# Patient Record
Sex: FEMALE | Race: WHITE | Employment: FULL TIME | URBAN - METROPOLITAN AREA
[De-identification: names, ages, dates, MRNs, and addresses within clinical notes are randomized per-mention and may not be internally consistent; named-entity substitution may affect disease eponyms.]

---

## 2021-08-17 ENCOUNTER — OFFICE VISIT (OUTPATIENT)
Dept: ENDOCRINOLOGY | Age: 56
End: 2021-08-17
Payer: COMMERCIAL

## 2021-08-17 VITALS
SYSTOLIC BLOOD PRESSURE: 126 MMHG | HEART RATE: 85 BPM | OXYGEN SATURATION: 96 % | TEMPERATURE: 98 F | BODY MASS INDEX: 38.82 KG/M2 | RESPIRATION RATE: 14 BRPM | HEIGHT: 65 IN | WEIGHT: 233 LBS | DIASTOLIC BLOOD PRESSURE: 80 MMHG

## 2021-08-17 DIAGNOSIS — E66.01 CLASS 2 SEVERE OBESITY WITH SERIOUS COMORBIDITY AND BODY MASS INDEX (BMI) OF 38.0 TO 38.9 IN ADULT, UNSPECIFIED OBESITY TYPE (HCC): ICD-10-CM

## 2021-08-17 DIAGNOSIS — E04.9 GOITER: ICD-10-CM

## 2021-08-17 DIAGNOSIS — E78.2 MIXED HYPERLIPIDEMIA: ICD-10-CM

## 2021-08-17 PROBLEM — E66.9 CLASS 2 OBESITY WITH BODY MASS INDEX (BMI) OF 38.0 TO 38.9 IN ADULT: Status: ACTIVE | Noted: 2021-08-17

## 2021-08-17 PROBLEM — E66.812 CLASS 2 OBESITY WITH BODY MASS INDEX (BMI) OF 38.0 TO 38.9 IN ADULT: Status: ACTIVE | Noted: 2021-08-17

## 2021-08-17 PROCEDURE — 99204 OFFICE O/P NEW MOD 45 MIN: CPT | Performed by: INTERNAL MEDICINE

## 2021-08-17 RX ORDER — GLIPIZIDE 5 MG/1
TABLET, FILM COATED, EXTENDED RELEASE ORAL
COMMUNITY
Start: 2021-07-27 | End: 2021-08-17 | Stop reason: SDUPTHER

## 2021-08-17 RX ORDER — GLIPIZIDE 5 MG/1
TABLET, FILM COATED, EXTENDED RELEASE ORAL
Qty: 120 TABLET | Refills: 3
Start: 2021-08-17 | End: 2021-10-28 | Stop reason: SDUPTHER

## 2021-08-17 RX ORDER — LITHIUM CARBONATE 300 MG/1
CAPSULE ORAL
COMMUNITY
Start: 2021-07-19

## 2021-08-17 RX ORDER — ATORVASTATIN CALCIUM 20 MG/1
20 TABLET, FILM COATED ORAL DAILY
COMMUNITY
Start: 2021-06-02

## 2021-08-17 RX ORDER — HYDROXYZINE 50 MG/1
TABLET, FILM COATED ORAL
COMMUNITY
Start: 2021-07-20 | End: 2022-02-22

## 2021-08-17 RX ORDER — LAMOTRIGINE 25 MG/1
TABLET ORAL
COMMUNITY
Start: 2021-07-20 | End: 2022-05-24

## 2021-08-17 RX ORDER — ALPRAZOLAM 0.25 MG/1
TABLET ORAL
COMMUNITY
Start: 2021-07-20

## 2021-08-17 NOTE — PROGRESS NOTES
Jose Bojorquez is a 50 y.o. female who is being evaluated for Type 2 diabetes mellitus. Current symptoms/problems include hyperglycemia and are worsening. Type 2 diabetes mellitus, uncontrolled, with neuropathy (HCC) [E11.40, E11.65]    Diagnosed with Type 2 diabetes mellitus in 2018. Comorbid conditions: hyperlipidemia, obesity and Neuropathy    Current diabetic medications include: glipizide ER 5 mg 2 tabs bid, Metformin 500 mg 2 tabs in AM  Was not always taking her medication, but now taking them    Intolerance to diabetes medications: Yes Metformin higher dose caused diarrhea  Ozempic, Januvia too expensive    Weight trend: stable  Prior visit with dietician: yes  Current diet: on average, 3 meals per day  Current exercise: no     Current monitoring regimen: home blood tests - 2 times daily  Has brought blood glucose log/meter:  Yes  Home blood sugar records: fasting range: 130-220 and postprandial range: 160-200  Any episodes of hypoglycemia? No  Hypoglycemia frequency and time(s):    Does patient have Glucagon emergency kit? No  Does patient have rapid acting carbohydrate? No  Does patient wear a medic alert bracelet or necklace? No    2. Mixed hyperlipidemia  Has muscle pain    3. Class 2 severe obesity with serious comorbidity and body mass index (BMI) of 38.0 to 38.9 in adult, unspecified obesity type (HCC)  Trying to eat healthier  No exercise.     4.  Goiter  No difficulty swallowing, voice change  No history of radiation to her neck  No family history of thyroid cancer    Past Medical History:   Diagnosis Date    Anxiety     Diabetes mellitus ([address]     Hyperlipidemia       Patient Active Problem List   Diagnosis    Type 2 diabetes, uncontrolled, with [facility]    Mixed hyperlipidemia    Class 2 obesity with body mass index (BMI) of 38.0 to 38.9 in adult     Past Surgical History:   Procedure Laterality Date    HYSTERECTOMY      LITHOTRIPSY      x8     Social History Socioeconomic History    Marital status:      Spouse name: Not on file    Number of children: Not on file    Years of education: Not on file    Highest education level: Not on file   Occupational History    Not on file   Tobacco Use    Smoking status: Never Smoker    Smokeless tobacco: Never Used   Vaping Use    Vaping Use: Never used   Substance and Sexual Activity    Alcohol use: Not Currently    Drug use: Yes     Types: Marijuana     Comment: occasional    Sexual activity: Yes     Partners: Male   Other Topics Concern    Not on file   Social History Narrative    Not on file     Social Determinants of Health     Financial Resource Strain:     Difficulty of Paying Living Expenses:    Food Insecurity:     Worried About Running Out of Food in the Last Year:     [address] in the Last Year:    Transportation Needs:     Lack of Transportation (Medical):      Lack of Transportation (Non-Medical):    Physical Activity:     Days of Exercise per Week:     Minutes of Exercise per Session:    Stress:     Feeling of Stress :    Social Connections:     Frequency of Communication with Friends and Family:     Frequency of Social Gatherings with Friends and Family:     Attends Rest[employer] Services:     Active Member of Clubs or Organizations:     Attends Club or Organization Meetings:     Marital Status:    Intimate Partner Violence:     Fear of Current or Ex-Partner:     Emotionally Abused:     Physically Abused:     Sexually Abused:      Family History   Problem Relation Age of Onset    Diabetes type 2  Mother     Cancer Mother         pancreas    Cancer Father         esophagus    Diabetes type 2  Maternal Grandmother     Diabetes type 2  Maternal Grandfather     Diabetes Type 1  Daughter      Current Outpatient Medications   Medication Sig Dispense Refill    ALPRAZolam (XANAX) 0.25 MG tablet TAKE 1 TO 2 TABLETS BY MOUTH EVERY DAY AS NEEDED FOR EXTREME ANXIETY      orthopnea, no intermittent leg claudication, no palpitations  Gastrointestinal: no abdominal pain, no nausea, no vomiting, no diarrhea, no constipation, no dysphagia, no heartburn, no bloating  Genitourinary: no dysuria, no urinary incontinence, no urinary hesitancy, no urinary frequency, no feelings of urinary urgency, has nocturia  Musculoskeletal: no joint swelling, no joint stiffness, no joint pain, no muscle cramps, no muscle pain  Integument/Breast: no hair loss, no skin rashes, no skin lesions, no itching, has dry skin  Neurological: no numbness, no tingling, no weakness, no confusion, has headaches, no dizziness, no fainting, no tremors, no decrease in memory, no balance problems  Psychiatric: has anxiety, has depression, has insomnia  Hematologic/Lymphatic: no tendency for easy bleeding, no swollen lymph nodes, no tendency for easy bruising  Immunology: no seasonal allergies, no frequent infections, no frequent illnesses  Endocrine: no temperature intolerance    /80   Pulse 85   Temp 98 °F (36.7 °C)   Resp 14   Ht 5' 5\" (1.651 m)   Wt 233 lb (105.7 kg)   SpO2 96%   BMI 38.77 kg/m²    Wt Readings from Last 3 Encounters:   08/17/21 233 lb (105.7 kg)     Body mass index is 38.77 kg/m².       OBJECTIVE:  Constitutional: no acute distress, well appearing and well nourished  Psychiatric: oriented to person, place and time, judgement and insight and normal, recent and remote memory and intact and mood and affect are normal  Skin: skin and subcutaneous tissue is normal without mass, normal turgor  Head and Face: examination of head and face revealed no abnormalities  Eyes: no lid or conjunctival swelling, erythema or discharge, pupils are normal, equal, round, reactive to light  Ears/Nose: external inspection of ears and nose revealed no abnormalities, hearing is grossly normal  Oropharynx/Mouth/Face: lips, tongue and gums are normal with no lesions, the voice quality was normal  Neck: neck is supple hyperlipidemia  - atorvastatin (LIPITOR) 20 MG tablet  - Comprehensive Metabolic Panel; Future  - Lipid Panel; Future    3. Class 2 severe obesity with serious comorbidity and body mass index (BMI) of 38.0 to 38.9 in adult, unspecified obesity type (HCC)  Diet, exercise    4. Goiter  - T3, Free; Future  - T4, Free; Future  - TSH without Reflex; Future  - Anti-Thyroglobulin Antibody; Future  - Thyroid Peroxidase Antibody; Future  - US HEAD NECK SOFT TISSUE THYROID; Future      Reviewed and/or ordered clinical lab results Yes  Reviewed and/or ordered radiology tests Yes  Reviewed and/or ordered other diagnostic tests No  Discussed test results with performing physician No  Independently reviewed image, tracing, or specimen No  Made a decision to obtain old records No  Reviewed and summarized old records Yes   HbA1C 10.6    LDL 93  0.81  Obtained history from other than patient No    Kate Lackey was counseled regarding symptoms of diabetes, thyroid disease diagnosis, course and complications of disease if inadequately treated, side effects of medications, diagnosis, treatment options, and prognosis, risks, benefits, complications, and alternatives of treatment, labs, imaging and other studies and treatment targets and goals, medications available for treatment, side effects, benefits, complication prevention, basal and prandial glucose production and insulin requirements,. She understands instructions and counseling. These diagnosis were discussed and reviewed with the patient including the advantages of drug therapy. She was counseled at this visit on the following: diabetes complication prevention and foot care. Total time I spent for this encounter 45 minutes    Return in about 1 month (around 9/17/2021) for diabetes.     Electronically signed by Farideh Lock MD on 8/28/2021 at 11:54 PM

## 2021-09-07 ENCOUNTER — HOSPITAL ENCOUNTER (OUTPATIENT)
Dept: ULTRASOUND IMAGING | Age: 56
Discharge: HOME | End: 2021-09-07
Payer: COMMERCIAL

## 2021-09-07 DIAGNOSIS — E04.9 GOITER: ICD-10-CM

## 2021-09-07 PROCEDURE — 76536 US EXAM OF HEAD AND NECK: CPT

## 2021-10-28 RX ORDER — GLIPIZIDE 5 MG/1
TABLET, FILM COATED, EXTENDED RELEASE ORAL
Qty: 120 TABLET | Refills: 0 | Status: SHIPPED | OUTPATIENT
Start: 2021-10-28 | End: 2022-02-22

## 2021-10-28 NOTE — TELEPHONE ENCOUNTER
Requested Prescriptions     Pending Prescriptions Disp Refills    glipiZIDE (GLUCOTROL XL) 5 MG extended release tablet 120 tablet 0     Si tabs bid

## 2021-11-22 ENCOUNTER — PATIENT MESSAGE (OUTPATIENT)
Dept: ENDOCRINOLOGY | Age: 56
End: 2021-11-22

## 2021-11-22 NOTE — TELEPHONE ENCOUNTER
From: Toribio Cano  To: Dr. Gerry Gunter: 11/22/2021 2:02 PM EST  Subject: Prescription Question    I only have enough metformin to get me until Friday so I need a refill as soon as possible. On another note, I believe I've mentioned this before. I need all my prescriptions to go through our home delivery service, [facility]. I'm hoping she can call in a prescription as soon as possible, last month I was out for almost 2 weeks before getting it. Thanks for your time and help!     Toribio Cano

## 2022-02-14 DIAGNOSIS — E04.9 GOITER: ICD-10-CM

## 2022-02-14 DIAGNOSIS — E78.2 MIXED HYPERLIPIDEMIA: ICD-10-CM

## 2022-02-14 LAB
A/G RATIO: 1.5 (ref 1.1–2.2)
ALBUMIN SERPL-MCNC: 4.6 G/DL (ref 3.4–5)
ALP BLD-CCNC: 80 U/L (ref 40–129)
ALT SERPL-CCNC: 31 U/L (ref 10–40)
ANION GAP SERPL CALCULATED.3IONS-SCNC: 15 MMOL/L (ref 3–16)
ANTI-THYROGLOB ABS: 152 IU/ML
AST SERPL-CCNC: 20 U/L (ref 15–37)
BILIRUB SERPL-MCNC: 0.4 MG/DL (ref 0–1)
BUN BLDV-MCNC: 11 MG/DL (ref 7–20)
CALCIUM SERPL-MCNC: 9.6 MG/DL (ref 8.3–10.6)
CHLORIDE BLD-SCNC: 100 MMOL/L (ref 99–110)
CHOLESTEROL, TOTAL: 166 MG/DL (ref 0–199)
CO2: 20 MMOL/L (ref 21–32)
CREAT SERPL-MCNC: 0.8 MG/DL (ref 0.6–1.1)
CREATININE URINE: 166 MG/DL (ref 28–259)
GFR AFRICAN AMERICAN: >60
GFR NON-AFRICAN AMERICAN: >60
GLUCOSE BLD-MCNC: 222 MG/DL (ref 70–99)
HDLC SERPL-MCNC: 43 MG/DL (ref 40–60)
LDL CHOLESTEROL CALCULATED: 84 MG/DL
MICROALBUMIN UR-MCNC: 2.2 MG/DL
MICROALBUMIN/CREAT UR-RTO: 13.3 MG/G (ref 0–30)
POTASSIUM SERPL-SCNC: 4.7 MMOL/L (ref 3.5–5.1)
SODIUM BLD-SCNC: 135 MMOL/L (ref 136–145)
T3 FREE: 3.3 PG/ML (ref 2.3–4.2)
T4 FREE: 1.2 NG/DL (ref 0.9–1.8)
THYROID PEROXIDASE (TPO) ABS: 16 IU/ML
TOTAL PROTEIN: 7.6 G/DL (ref 6.4–8.2)
TRIGL SERPL-MCNC: 196 MG/DL (ref 0–150)
TSH SERPL DL<=0.05 MIU/L-ACNC: 3.94 UIU/ML (ref 0.27–4.2)
VLDLC SERPL CALC-MCNC: 39 MG/DL

## 2022-02-15 LAB
ESTIMATED AVERAGE GLUCOSE: 203 MG/DL
HBA1C MFR BLD: 8.7 %

## 2022-02-16 LAB — C-PEPTIDE: 8.1 NG/ML (ref 1.1–4.4)

## 2022-02-22 ENCOUNTER — TELEMEDICINE (OUTPATIENT)
Dept: ENDOCRINOLOGY | Age: 57
End: 2022-02-22
Payer: COMMERCIAL

## 2022-02-22 DIAGNOSIS — E78.2 MIXED HYPERLIPIDEMIA: ICD-10-CM

## 2022-02-22 DIAGNOSIS — E66.01 CLASS 2 SEVERE OBESITY WITH SERIOUS COMORBIDITY AND BODY MASS INDEX (BMI) OF 38.0 TO 38.9 IN ADULT, UNSPECIFIED OBESITY TYPE (HCC): ICD-10-CM

## 2022-02-22 PROCEDURE — 3052F HG A1C>EQUAL 8.0%<EQUAL 9.0%: CPT | Performed by: INTERNAL MEDICINE

## 2022-02-22 PROCEDURE — 99215 OFFICE O/P EST HI 40 MIN: CPT | Performed by: INTERNAL MEDICINE

## 2022-02-22 RX ORDER — DULAGLUTIDE 0.75 MG/.5ML
0.75 INJECTION, SOLUTION SUBCUTANEOUS WEEKLY
Qty: 4 PEN | Refills: 3 | Status: SHIPPED | OUTPATIENT
Start: 2022-02-22 | End: 2022-05-24 | Stop reason: SDUPTHER

## 2022-02-22 RX ORDER — GLIPIZIDE 5 MG/1
TABLET, FILM COATED, EXTENDED RELEASE ORAL
Qty: 360 TABLET | Refills: 1 | Status: SHIPPED | OUTPATIENT
Start: 2022-02-22 | End: 2022-05-24 | Stop reason: SDUPTHER

## 2022-02-22 RX ORDER — METFORMIN HYDROCHLORIDE 500 MG/1
500 TABLET, EXTENDED RELEASE ORAL
Qty: 180 TABLET | Refills: 1 | Status: SHIPPED | OUTPATIENT
Start: 2022-02-22 | End: 2022-05-24 | Stop reason: SDUPTHER

## 2022-02-22 RX ORDER — FLASH GLUCOSE SENSOR
KIT MISCELLANEOUS
Qty: 2 EACH | Refills: 3 | Status: SHIPPED | OUTPATIENT
Start: 2022-02-22 | End: 2022-07-05

## 2022-02-22 NOTE — PROGRESS NOTES
Mora Meier is a 49 y.o. female who is being evaluated for Type 2 diabetes mellitus. 2022    TELEHEALTH EVALUATION -- Audio/Visual (During [facility] public health emergency)    Patient provided verbal consent to use the video visit. HPI:    Mora Meier (:  1965) has requested an audio/video evaluation for the following concern(s):    Current symptoms/problems include hyperglycemia and are worsening. Type 2 diabetes, uncontrolled, with neuropathy (HCC) [E11.40, E11.65]    Diagnosed with Type 2 diabetes mellitus in 2018. Comorbid conditions: hyperlipidemia, obesity and Neuropathy    Current diabetic medications include: glipizide ER 5 mg 2 tabs bid, Metformin 500 mg 2 tabs in AM    Intolerance to diabetes medications: Yes Metformin higher dose caused diarrhea  Ozempic Januvia too expensive    Weight trend: stable  Prior visit with dietician: yes  Current diet: on average, 3 meals per day  Current exercise: no     Current monitoring regimen: home blood tests - 2 times daily  Has brought blood glucose log/meter:  Yes  Home blood sugar records: fasting range: 130-190 and postprandial range: 150-190  Any episodes of hypoglycemia? No  Hypoglycemia frequency and time(s):    Does patient have Glucagon emergency kit? No  Does patient have rapid acting carbohydrate? No  Does patient wear a medic alert bracelet or necklace? No    2. Mixed hyperlipidemia  Has muscle pain    3. Class 2 severe obesity with serious comorbidity and body mass index (BMI) of 38.0 to 38.9 in adult, unspecified obesity type (HCC)  Trying to eat healthier  No exercise. No difficulty swallowing, voice change  No history of radiation to her neck  No family history of thyroid cancer    4.  Hashimoto's thyroiditis  Has fatigue, dry skin, thinner hair    EXAM: US THYROID        INDICATION:  Goiter       COMPARISON:  None       TECHNIQUE:  Multiplanar grayscale analysis of the thyroid was performed.           FINDINGS:     Isthmus: 3 mm in anteroposterior dimension.       Right Lobe: 4.5 x 2.3 x 1.4 cm.       Left Lobe: 4.1 x 2.0 x 1.6 cm.       Other: The thyroid gland is diffusely heterogeneous in echogenicity, but no discrete cyst or nodule identified.               Impression       Heterogeneous thyroid gland, but no suspicious nodules.           Past Medical History:   Diagnosis Date    Anxiety     Diabetes mellitus [facility]     Hyperlipidemia       Patient Active Problem List   Diagnosis    Type 2 diabetes, uncontrolled, with neuropathy ([facility] [facility]    Mixed hyperlipidemia    Class 2 obesity with body mass index (BMI) of 38.0 to 38.9 in adult     Past Surgical History:   Procedure Laterality Date    HYSTERECTOMY      LITHOTRIPSY      x8     Social History     Socioeconomic History    Marital status:      Spouse name: Not on file    Number of children: Not on file    Years of education: Not on file    Highest education level: Not on file   Occupational History    Not on file   Tobacco Use    Smoking status: Never Smoker    Smokeless tobacco: Never Used   Vaping Use    Vaping Use: Never used   Substance and Sexual Activity    Alcohol use: Not Currently    Drug use: Yes     Types: Marijuana Clyo Madeline)     Comment: occasional    Sexual activity: Yes     Partners: Male   Other Topics Concern    Not on file   Social History Narrative    Not on file     Social Determinants of Health     Financial Resource Strain:     Difficulty of Paying Living Expenses: Not on file   Food Insecurity:     Worried About Running Out of Food in the Last Year: Not on file    [place]ael of Food in the Last Year: Not on file   Transportation Needs:     Lack of Transportation (Medical): Not on file    Lack of Transportation (Non-Medical):  Not on file   Physical Activity:     Days of Exercise per Week: Not on file    Minutes of Exercise per Session: Not on file   Stress:     Feeling of Stress : Not on file   Social Connections:     Frequency of Communication with Friends and Family: Not on file    Frequency of Social Gatherings with Friends and Family: Not on file    Attends Synagogue Services: Not on file    Active Member of Clubs or Organizations: Not on file    Attends Club or Organization Meetings: Not on file    Marital Status: Not on file   Intimate Partner Violence:     Fear of Current or Ex-Partner: Not on file    Emotionally Abused: Not on file    Physically Abused: Not on file    Sexually Abused: Not on file   Housing Stability:     Unable to Pay for Housing in the Last Year: Not on file    Number of [place] in the Last Year: Not on file    Unstable Housing in the Last Year: Not on file     Family History   Problem Relation Age of Onset    Diabetes type 2  Mother     Cancer Mother         pancreas    Cancer Father         esophagus    Diabetes type 2  Maternal Grandmother     Diabetes type 2  Maternal Grandfather     Diabetes Type 1  Daughter      Current Outpatient Medications   Medication Sig Dispense Refill    OMEPRAZOLE PO Take by mouth      metFORMIN (GLUCOPHAGE-XR) 500 MG extended release tablet Take 1 tablet by mouth daily (with breakfast) 180 tablet 1    Dulaglutide (TRULICITY) 8.28 PF/8.2TA SOPN Inject 0.75 mg into the skin once a week 4 pen 3    glipiZIDE (GLUCOTROL XL) 5 MG extended release tablet 2 tabs twice a day 360 tablet 1    Continuous Blood Gluc Sensor (FREESTYLE DAVID 2 SENSOR) MISC Change every 14 days 2 each 3    ALPRAZolam (XANAX) 0.25 MG tablet TAKE 1 TO 2 TABLETS BY MOUTH EVERY DAY AS NEEDED FOR EXTREME ANXIETY      atorvastatin (LIPITOR) 20 MG tablet Take 20 mg by mouth daily       lamoTRIgine (LAMICTAL) 25 MG tablet TAKE 1 TABLET BY MOUTH AT BEDTIME FOR 14 DAYS AS DIRECTED. INCREASE TO 2 TABLETS. STOP IF RASH OCCURS      lithium 300 MG capsule TAKE 1 CAPSULE BY MOUTH EVERY MORNING       No current facility-administered medications for this visit.      No Known Allergies  Family Status   Relation Name Status    Mother      Father      MGM  (Not Specified)    MGF  (Not Specified)    Karen  (Not Specified)       Lab Review:    No results found for: WBC, HGB, HCT, MCV, PLT  Lab Results   Component Value Date     2022    K 4.7 2022     2022    CO2 20 2022    BUN 11 2022    CREATININE 0.8 2022    GLUCOSE 222 2022    CALCIUM 9.6 2022    PROT 7.6 2022    LABALBU 4.6 2022    BILITOT 0.4 2022    ALKPHOS 80 2022    AST 20 2022    ALT 31 2022    LABGLOM >60 2022    GFRAA >60 2022    AGRATIO 1.5 2022     Lab Results   Component Value Date    TSH 3.94 2022    FT3 3.3 2022     Lab Results   Component Value Date    LABA1C 8.7 2022     Lab Results   Component Value Date    .0 2022     Lab Results   Component Value Date    CHOL 166 2022     Lab Results   Component Value Date    TRIG 196 2022     Lab Results   Component Value Date    HDL 43 2022     Lab Results   Component Value Date    LDLCALC 84 2022     Lab Results   Component Value Date    LABVLDL 39 2022     No results found for: CHOLHDLRATIO  Lab Results   Component Value Date    LABMICR 2.20 2022     No results found for: VITD25     Review of Systems:  Constitutional: no fatigue, no fever, no recent weight gain, no recent weight loss, no changes in appetite  Eyes: no eye pain, has change in vision, no eye redness, no eye irritation, no double vision  Ears, nose, throat: no nasal congestion, no sore throat, no earache, no decrease in hearing, no hoarseness, no dry mouth, no sinus problems, no difficulty swallowing, no neck lumps, no dental problems, no mouth sores, no ringing in ears  Pulmonary: no shortness of breath, no wheezing, no dyspnea on exertion, no cough  Cardiovascular: no chest pain, no lower extremity edema, no orthopnea, no intermittent leg claudication, no palpitations  Gastrointestinal: no abdominal pain, no nausea, no vomiting, no diarrhea, no constipation, no dysphagia, no heartburn, no bloating  Genitourinary: no dysuria, no urinary incontinence, no urinary hesitancy, no urinary frequency, no feelings of urinary urgency, has nocturia  Musculoskeletal: no joint swelling, no joint stiffness, no joint pain, no muscle cramps, no muscle pain  Integument/Breast: no hair loss, no skin rashes, no skin lesions, no itching, has dry skin  Neurological: no numbness, no tingling, no weakness, no confusion, has headaches, no dizziness, no fainting, no tremors, no decrease in memory, no balance problems  Psychiatric: has anxiety, has depression, has insomnia  Hematologic/Lymphatic: no tendency for easy bleeding, no swollen lymph nodes, no tendency for easy bruising  Immunology: no seasonal allergies, no frequent infections, no frequent illnesses  Endocrine: no temperature intolerance    There were no vitals taken for this visit. Wt Readings from Last 3 Encounters:   08/17/21 233 lb (105.7 kg)     There is no height or weight on file to calculate BMI.     OBJECTIVE:  Constitutional: no apparent distress, well developed and well nourished  Mental status: alert and awake, oriented to person, place and time, able to follow commands  Psychiatric: judgement and insight and normal, recent and remote memory are intact, mood and affect are normal  Skin: skin inspection appears normal, no significant exanthematous lesions or discoloration noted on facial skin  Head and Face: head and face inspection revealed no abnormalities, normocephalic, atraumatic  Eyes: no lid or conjunctival swelling, erythema or discharge, sclera appears normal  Ears/Nose: external inspection of ears and nose revealed no abnormalities, hearing is grossly normal  Oropharynx/Mouth/Face: lips are normal with no lesions, the voice quality was normal  Neck: neck is symmetric, no visualized mass  Pulmonary/chest: respiratory effort normal, no generalized signs of difficulty breathing or signs of respiratory distress  Musculoskeletal: normal station, normal range of motion of neck  Neurological: no facial asymmetry, normal general cortical function    ASSESSMENT/PLAN:  1. Type 2 diabetes mellitus, uncontrolled, with neuropathy ([facility] [address]  Recommend Richar  Uncontrolled diabetes, fluctuating BG  Changed insurance, will start Trulicity 5.66 mg, increase as tolerated in 1 month  Hemoglobin A1c 10.68.7  C-peptide 8.1, glucose 222  Check BG 3-4 times daily  Dietitian consultation  And discussed with patient that she will need more medications or insulin to control her diabetes. Patient stated that few of her medications were too expensive. I discussed insulin regimen. She would like to try diet and exercise. - metFORMIN (GLUCOPHAGE) 500 MG ER tablet; 2 tabs every morning   - glipiZIDE (GLUCOTROL XL) 5 MG extended release tablet; 2 tabs bid    - Hemoglobin A1C; Future  - Comprehensive Metabolic Panel; Future  - Lipid Panel; Future  - Microalbumin / Creatinine Urine Ratio; Future  - [facility] Individual Diabetes Education (Non Care Coord Patient), [facility]    2. Mixed hyperlipidemia  LDL 84  Triglycerides 196  HDL 43  TSH 3.94  - atorvastatin (LIPITOR) 20 MG tablet  - Comprehensive Metabolic Panel; Future  - Lipid Panel; Future    3. Class 2 severe obesity with serious comorbidity and body mass index (BMI) of 38.0 to 38.9 in adult, unspecified obesity type (HCC)  Diet, exercise    4. Hashimoto's thyroiditis  TSH 3.94  - T3, Free; Future  - T4, Free; Future  - TSH without Reflex;  Future      Reviewed and/or ordered clinical lab results Yes  Reviewed and/or ordered radiology tests Yes  Reviewed and/or ordered other diagnostic tests No  Discussed test results with performing physician No  Independently reviewed image, tracing, or specimen No  Made a decision to obtain old records No  Reviewed and summarized old records Yes   HbA1C 10.6    LDL 93  0.81  Obtained history from other than patient No    Ann Wolfe was counseled regarding symptoms of diabetes, thyroid disease diagnosis, course and complications of disease if inadequately treated, side effects of medications, diagnosis, treatment options, and prognosis, risks, benefits, complications, and alternatives of treatment, labs, imaging and other studies and treatment targets and goals, medications available for treatment, side effects, benefits, complication prevention, basal and prandial glucose production and insulin requirements, Trulicity, Richar 2. She understands instructions and counseling. These diagnosis were discussed and reviewed with the patient including the advantages of drug therapy. She was counseled at this visit on the following: diabetes complication prevention and foot care. Ann Wolfe is a 49 y.o. female was evaluated through a synchronous (real-time) audio-video encounter. The patient (or guardian if applicable) is aware that this is billable service, which includes applicable co-pays. This virtual visit was conducted with the patient's (and/or legal guardian's) consent. The visit was conducted pursuant to the emergency declaration under the [address] [place] [address], [address] [place] [address] authority and the [employer] General Act. Patient identification was verified, and a caregiver was present when appropriate. I conducted an interview, performed a limited exam by video and educated the patient on my assessment and plan. Due to this being a TeleHealth encounter (During [facility] public health emergency), evaluation of the following organ systems was limited: Vitals/Constitutional/EENT/Resp/CV/GI//MS/Neuro/Skin/Heme-Lymph-Imm.       The patient (and/or legal guardian) has also been advised to contact this office for worsening conditions or problems, and seek emergency medical treatment and/or call 911 if deemed necessary. Total time spent on this encounter via Telehealth (synchronous, real-time audio/visual connection): 40 min  See assessment, plan and counseling note for counseling and care coordination details. Persons participating in the telehealth service: provider - Patsy Galindo MD and patient Ann Wolfe. Provider was located at her office. Patient was located at home. The patient was located in the state where the provider was licensed to provide care. --Patsy Galindo MD on 2/22/2022 at 7:42 AM    An electronic signature was used to authenticate this note. Return in about 3 months (around 5/22/2022) for diabetes.     Electronically signed by Patsy Galindo MD on 2/22/2022 at 7:42 AM

## 2022-03-31 ENCOUNTER — PATIENT MESSAGE (OUTPATIENT)
Dept: ENDOCRINOLOGY | Age: 57
End: 2022-03-31

## 2022-03-31 NOTE — TELEPHONE ENCOUNTER
From: Mala Willingham  To: Dr. Davison Hum: 3/31/2022 10:12 AM EDT  Subject: Trulicity Questions    Have been taking Trulicity for 4 weeks, but just recently (within past few days), I have been having crazy low glucose readings, then high spike, then lows again (on 3/30 had 10 of them). Also today (3/31) I'm having a bit of abdomen pain (not severe but noticeable). In addition have a small rash around injection site. This is the first time since beginning Trulicity that I've had these issues, is this normal? I would have expected these symptoms to appear early on. Also I'm using a Continuous Glucose Monitor (Freestyle Richar 2) as well as doing my finger sticks, some times they are several \"points\" different (like 38-40) but other times they are within 5-10 \"points\" different. Which should I trust as being the more accurate? Thank you for your time and help.     Danita Latham

## 2022-04-01 ENCOUNTER — TELEPHONE (OUTPATIENT)
Dept: ENDOCRINOLOGY | Age: 57
End: 2022-04-01

## 2022-04-01 NOTE — TELEPHONE ENCOUNTER
I spoke with patient. I informed her that if she has a worsening of abdominal pain, nausea, vomiting, dehydration, go to the ER because potential adverse effect this pancreatitis. Patient stated that abdominal pain is not significant at this time. Discussed CGM and fingerstick differences in blood glucose levels, causes for it, answered other questions. Advised to follow carefully for site reaction and if reaction persists or gets worse, she might not be able to continue Trulicity. Also advised patient that if blood glucose is low, she will need to decrease glipizide.

## 2022-05-19 DIAGNOSIS — E78.2 MIXED HYPERLIPIDEMIA: ICD-10-CM

## 2022-05-19 LAB
A/G RATIO: 1.6 (ref 1.1–2.2)
ALBUMIN SERPL-MCNC: 4.4 G/DL (ref 3.4–5)
ALP BLD-CCNC: 94 U/L (ref 40–129)
ALT SERPL-CCNC: 26 U/L (ref 10–40)
ANION GAP SERPL CALCULATED.3IONS-SCNC: 15 MMOL/L (ref 3–16)
AST SERPL-CCNC: 17 U/L (ref 15–37)
BILIRUB SERPL-MCNC: 0.4 MG/DL (ref 0–1)
BUN BLDV-MCNC: 15 MG/DL (ref 7–20)
CALCIUM SERPL-MCNC: 9.6 MG/DL (ref 8.3–10.6)
CHLORIDE BLD-SCNC: 103 MMOL/L (ref 99–110)
CHOLESTEROL, TOTAL: 132 MG/DL (ref 0–199)
CO2: 21 MMOL/L (ref 21–32)
CREAT SERPL-MCNC: 0.9 MG/DL (ref 0.6–1.1)
GFR AFRICAN AMERICAN: >60
GFR NON-AFRICAN AMERICAN: >60
GLUCOSE BLD-MCNC: 120 MG/DL (ref 70–99)
HDLC SERPL-MCNC: 41 MG/DL (ref 40–60)
LDL CHOLESTEROL CALCULATED: 69 MG/DL
POTASSIUM SERPL-SCNC: 4.3 MMOL/L (ref 3.5–5.1)
SODIUM BLD-SCNC: 139 MMOL/L (ref 136–145)
T3 FREE: 2.9 PG/ML (ref 2.3–4.2)
T4 FREE: 1.2 NG/DL (ref 0.9–1.8)
TOTAL PROTEIN: 7.2 G/DL (ref 6.4–8.2)
TRIGL SERPL-MCNC: 108 MG/DL (ref 0–150)
TSH SERPL DL<=0.05 MIU/L-ACNC: 3 UIU/ML (ref 0.27–4.2)
VLDLC SERPL CALC-MCNC: 22 MG/DL

## 2022-05-20 LAB
ESTIMATED AVERAGE GLUCOSE: 145.6 MG/DL
HBA1C MFR BLD: 6.7 %

## 2022-05-24 ENCOUNTER — OFFICE VISIT (OUTPATIENT)
Dept: ENDOCRINOLOGY | Age: 57
End: 2022-05-24
Payer: COMMERCIAL

## 2022-05-24 VITALS
DIASTOLIC BLOOD PRESSURE: 95 MMHG | HEIGHT: 65 IN | BODY MASS INDEX: 36.29 KG/M2 | OXYGEN SATURATION: 97 % | WEIGHT: 217.8 LBS | SYSTOLIC BLOOD PRESSURE: 150 MMHG | HEART RATE: 77 BPM

## 2022-05-24 DIAGNOSIS — E06.3 HASHIMOTO'S THYROIDITIS: ICD-10-CM

## 2022-05-24 DIAGNOSIS — E66.01 CLASS 2 SEVERE OBESITY WITH SERIOUS COMORBIDITY AND BODY MASS INDEX (BMI) OF 38.0 TO 38.9 IN ADULT, UNSPECIFIED OBESITY TYPE (HCC): ICD-10-CM

## 2022-05-24 DIAGNOSIS — E11.40 TYPE 2 DIABETES, CONTROLLED, WITH NEUROPATHY (HCC): Primary | ICD-10-CM

## 2022-05-24 DIAGNOSIS — E78.2 MIXED HYPERLIPIDEMIA: ICD-10-CM

## 2022-05-24 PROCEDURE — 3044F HG A1C LEVEL LT 7.0%: CPT | Performed by: INTERNAL MEDICINE

## 2022-05-24 PROCEDURE — 99214 OFFICE O/P EST MOD 30 MIN: CPT | Performed by: INTERNAL MEDICINE

## 2022-05-24 PROCEDURE — 95251 CONT GLUC MNTR ANALYSIS I&R: CPT | Performed by: INTERNAL MEDICINE

## 2022-05-24 RX ORDER — GLIPIZIDE 5 MG/1
TABLET, FILM COATED, EXTENDED RELEASE ORAL
Qty: 360 TABLET | Refills: 1 | Status: SHIPPED | OUTPATIENT
Start: 2022-05-24 | End: 2022-09-12

## 2022-05-24 RX ORDER — LAMOTRIGINE 100 MG/1
100 TABLET ORAL DAILY
COMMUNITY

## 2022-05-24 RX ORDER — METFORMIN HYDROCHLORIDE 500 MG/1
500 TABLET, EXTENDED RELEASE ORAL
Qty: 90 TABLET | Refills: 1 | Status: SHIPPED | OUTPATIENT
Start: 2022-05-24 | End: 2022-10-03 | Stop reason: SDUPTHER

## 2022-05-24 RX ORDER — DULAGLUTIDE 0.75 MG/.5ML
0.75 INJECTION, SOLUTION SUBCUTANEOUS WEEKLY
Qty: 12 PEN | Refills: 1 | Status: SHIPPED | OUTPATIENT
Start: 2022-05-24 | End: 2022-10-03 | Stop reason: SDUPTHER

## 2022-05-24 NOTE — PROGRESS NOTES
Dimitri Gilford is a 49 y.o. female who is being evaluated for Type 2 diabetes mellitus. Current symptoms/problems include hyperglycemia and are worsening. Type 2 diabetes, controlled, with neuropathy ([address] [E11.40]    Diagnosed with Type 2 diabetes mellitus in 2018. Comorbid conditions: hyperlipidemia, obesity and Neuropathy     Current diabetic medications include: glipizide ER 5 mg 2 tabs bid, Metformin  mg 1 tab in AM, Trulicity 4.505 mg weekly     Intolerance to diabetes medications: Yes Metformin higher dose caused diarrhea  Ozempadalberto Januvia too expensive     Weight trend: stable  Prior visit with dietician: yes  Current diet: on average, 3 meals per day  Current exercise: no     Current monitoring regimen: home blood tests - 2 times daily  Has brought blood glucose log/meter:  Yes  Home blood sugar records: fasting range: 100-120 and postprandial range: 100-140  Any episodes of hypoglycemia? No  Hypoglycemia frequency and time(s):    Does patient have Glucagon emergency kit? No  Does patient have rapid acting carbohydrate?  No  Does patient wear a medic alert bracelet or necklace?  No     2. Mixed hyperlipidemia  Has muscle pain     3. Obesity  Trying to eat healthier  No exercise. No difficulty swallowing, voice change  No history of radiation to her neck  No family history of thyroid cancer     4.  Hashimoto's thyroiditis  Has fatigue, dry skin, thinner hair    Past Medical History:   Diagnosis Date    Anxiety     Diabetes mellitus ([address]     Hyperlipidemia       Patient Active Problem List   Diagnosis    Type 2 diabetes, uncontrolled, with neuropathy ([address]    Mixed hyperlipidemia    Class 2 obesity with body mass index (BMI) of 38.0 to 38.9 in adult    Hashimoto's thyroiditis    Type 2 diabetes, controlled, with neuropathy ([address]     Past Surgical History:   Procedure Laterality Date    HYSTERECTOMY      LITHOTRIPSY      x8     Social History     Socioeconomic History    Marital status:      Spouse name: Not on file    Number of children: Not on file    Years of education: Not on file    Highest education level: Not on file   Occupational History    Not on file   Tobacco Use    Smoking status: Never Smoker    Smokeless tobacco: Never Used   Vaping Use    Vaping Use: Never used   Substance and Sexual Activity    Alcohol use: Not Currently    Drug use: Yes     Types: Marijuana Darliss Meeter)     Comment: occasional    Sexual activity: Yes     Partners: Male   Other Topics Concern    Not on file   Social History Narrative    Not on file     Social Determinants of Health     Financial Resource Strain:     Difficulty of Paying Living Expenses: Not on file   Food Insecurity:     Worried About Running Out of Food in the Last Year: Not on file    Gael of Food in the Last Year: Not on file   Transportation Needs:     Lack of Transportation (Medical): Not on file    Lack of Transportation (Non-Medical):  Not on file   Physical Activity:     Days of Exercise per Week: Not on file    Minutes of Exercise per Session: Not on file   Stress:     Feeling of Stress : Not on file   Social Connections:     Frequency of Communication with Friends and Family: Not on file    Frequency of Social Gatherings with Friends and Family: Not on file    Attends [employer] Services: Not on file    Active Member of [address] [place] [address] [employer] or Organizations: Not on file    Attends Club or Organization Meetings: Not on file    Marital Status: Not on file   Intimate Partner Violence:     Fear of Current or Ex-Partner: Not on file    Emotionally Abused: Not on file    Physically Abused: Not on file    Sexually Abused: Not on file   Housing Stability:     Unable to Pay for Housing in the Last Year: Not on file    Number of [place] in the Last Year: Not on file    Unstable Housing in the Last Year: Not on file     Family History   Problem Relation Age of Onset    Diabetes type 2  Mother     Cancer Mother pancreas    Cancer Father         esophagus    Diabetes type 2  Maternal Grandmother     Diabetes type 2  Maternal Grandfather     Diabetes Type 1  Daughter      Current Outpatient Medications   Medication Sig Dispense Refill    lamoTRIgine (LAMICTAL) 100 MG tablet Take 100 mg by mouth daily      metFORMIN (GLUCOPHAGE-XR) 500 MG extended release tablet Take 1 tablet by mouth daily (with breakfast) 90 tablet 1    glipiZIDE (GLUCOTROL XL) 5 MG extended release tablet 2 tabs twice a day 360 tablet 1    Dulaglutide (TRULICITY) 7.88 JH/1.8BM SOPN Inject 0.75 mg into the skin once a week 12 pen 1    OMEPRAZOLE PO Take by mouth      Continuous Blood Gluc Sensor (FREESTYLE DAVID 2 SENSOR) MISC Change every 14 days 2 each 3    ALPRAZolam (XANAX) 0.25 MG tablet TAKE 1 TO 2 TABLETS BY MOUTH EVERY DAY AS NEEDED FOR EXTREME ANXIETY      atorvastatin (LIPITOR) 20 MG tablet Take 20 mg by mouth daily       lithium 300 MG capsule TAKE 1 CAPSULE BY MOUTH EVERY MORNING       No current facility-administered medications for this visit.      No Known Allergies  Family Status   Relation Name Status    Mother      Father      MGM  (Not Specified)    MGF  (Not Specified)    Karen  (Not Specified)       Lab Review:    No results found for: WBC, HGB, HCT, MCV, PLT  Lab Results   Component Value Date     2022    K 4.3 2022     2022    CO2 21 2022    BUN 15 2022    CREATININE 0.9 2022    GLUCOSE 120 2022    CALCIUM 9.6 2022    PROT 7.2 2022    LABALBU 4.4 2022    BILITOT 0.4 2022    ALKPHOS 94 2022    AST 17 2022    ALT 26 2022    LABGLOM >60 2022    GFRAA >60 2022    AGRATIO 1.6 2022     Lab Results   Component Value Date    TSH 3.00 2022    FT3 2.9 2022     Lab Results   Component Value Date    LABA1C 6.7 2022     Lab Results   Component Value Date    .6 2022 Lab Results   Component Value Date    CHOL 132 05/19/2022     Lab Results   Component Value Date    TRIG 108 05/19/2022     Lab Results   Component Value Date    HDL 41 05/19/2022     Lab Results   Component Value Date    LDLCALC 69 05/19/2022     Lab Results   Component Value Date    LABVLDL 22 05/19/2022     No results found for: [facility]  Lab Results   Component Value Date    LABMICR 2.20 02/14/2022     No results found for: VITD25     Review of Systems:  Constitutional: no fatigue, no fever, no recent weight gain, no recent weight loss, no changes in appetite  Eyes: no eye pain, has change in vision, no eye redness, no eye irritation, no double vision  Ears, nose, throat: no nasal congestion, no sore throat, no earache, no decrease in hearing, no hoarseness, no dry mouth, no sinus problems, no difficulty swallowing, no neck lumps, no dental problems, no mouth sores, no ringing in ears  Pulmonary: no shortness of breath, no wheezing, no dyspnea on exertion, no cough  Cardiovascular: no chest pain, no lower extremity edema, no orthopnea, no intermittent leg claudication, no palpitations  Gastrointestinal: no abdominal pain, no nausea, no vomiting, no diarrhea, no constipation, no dysphagia, no heartburn, no bloating  Genitourinary: no dysuria, no urinary incontinence, no urinary hesitancy, no urinary frequency, no feelings of urinary urgency, has nocturia  Musculoskeletal: no joint swelling, no joint stiffness, no joint pain, no muscle cramps, no muscle pain  Integument/Breast: no hair loss, no skin rashes, no skin lesions, no itching, has dry skin  Neurological: no numbness, no tingling, no weakness, no confusion, has headaches, no dizziness, no fainting, no tremors, no decrease in memory, no balance problems  Psychiatric: has anxiety, has depression, has insomnia  Hematologic/Lymphatic: no tendency for easy bleeding, no swollen lymph nodes, no tendency for easy bruising  Immunology: no seasonal allergies, no frequent infections, no frequent illnesses  Endocrine: no temperature intolerance    BP (!) 150/95 (Site: Right Upper Arm, Position: Sitting, Cuff Size: Large Adult)   Pulse 77   Ht 5' 5\" (1.651 m)   Wt 217 lb 12.8 oz (98.8 kg)   SpO2 97%   BMI 36.24 kg/m²    Wt Readings from Last 3 Encounters:   05/24/22 217 lb 12.8 oz (98.8 kg)   08/17/21 233 lb (105.7 kg)     Body mass index is 36.24 kg/m².       OBJECTIVE:  Constitutional: no acute distress, well appearing and well nourished  Psychiatric: oriented to person, place and time, judgement and insight and normal, recent and remote memory and intact and mood and affect are normal  Skin: skin and subcutaneous tissue is normal without mass, normal turgor  Head and Face: examination of head and face revealed no abnormalities  Eyes: no lid or conjunctival swelling, erythema or discharge, pupils are normal, equal, round, reactive to light  Ears/Nose: external inspection of ears and nose revealed no abnormalities, hearing is grossly normal  Oropharynx/Mouth/Face: lips, tongue and gums are normal with no lesions, the voice quality was normal  Neck: neck is supple and symmetric, with midline trachea and no masses, thyroid is enlarged, more on the left  Lymphatics: normal cervical lymph nodes, normal supraclavicular nodes  Pulmonary: no increased work of breathing or signs of respiratory distress, lungs are clear to auscultation  Cardiovascular: normal heart rate and rhythm, normal S1 and S2, no murmurs and pedal pulses and 2+ bilaterally, No edema  Abdomen: abdomen is soft, non-tender with no masses  Musculoskeletal: normal gait and station and exam of the digits and nails are normal  Neurological: normal coordination and normal general cortical function    Foot exam 8/2021:  Visual inspection:  Deformity/amputation: absent  Skin lesions/pre-ulcerative calluses: present calluses  Edema: right- negative, left- negative    Sensory exam:  Monofilament sensation: normal  (minimum of 5 random plantar locations tested, avoiding callused areas - > 1 area with absence of sensation is + for neuropathy)    Plus at least one of the following:  Pulses: normal  Proprioception: Intact  Vibration (128 Hz): Impaired    ASSESSMENT/PLAN:      1. Type 2 diabetes mellitus, controlled, with neuropathy ([facility]  Will do foot exam next appointment. Continue Zura!, will start Trulicity 9.88 mg, increase as tolerated in 1 month  Hemoglobin A1c 10.6-8.7-6.7  C-peptide 8.1, glucose 222  Check BG 3-4 times daily  Dietitian consultation when patient agrees  I discussed insulin regimen. Continue diet and exercise. - metFORMIN (GLUCOPHAGE) 500 MG ER tablet; 1 tab every morning   - glipiZIDE (GLUCOTROL XL) 5 MG extended release tablet; 2 tabs bid    - Hemoglobin A1C; Future  - Comprehensive Metabolic Panel; Future  - Lipid Panel; Future  - Microalbumin / Creatinine Urine Ratio; Future  - [facility] Individual Diabetes Education (Non Care Coord Patient)[facility]     2. Mixed hyperlipidemia  LDL 84-69  Triglycerides 196-108  HDL 43-41  TSH 3.94-3.00  - atorvastatin (LIPITOR) 20 MG tablet  - Comprehensive Metabolic Panel; Future  - Lipid Panel; Future     3. Obesity  Diet, exercise     4. Hashimoto's thyroiditis  TSH 3.94  - T3, Free; Future  - T4, Free; Future  - TSH without Reflex;  Future       Reviewed and/or ordered clinical lab results Yes  Reviewed and/or ordered radiology tests Yes  Reviewed and/or ordered other diagnostic tests No  Discussed test results with performing physician No  Independently reviewed image, tracing, or specimen No  Made a decision to obtain old records No  Reviewed and summarized old records Yes   HbA1C 10.6    LDL 93  0.81  Obtained history from other than patient No    Andrew Bright was counseled regarding symptoms of diabetes, thyroid disease diagnosis, course and complications of disease if inadequately treated, side effects of medications, diagnosis, treatment options, and prognosis, risks, benefits, complications, and alternatives of treatment, labs, imaging and other studies and treatment targets and goals, medications available for treatment, side effects, benefits, complication prevention, basal and prandial glucose production and insulin requirements,. She understands instructions and counseling. CGMS Download Review and Recommendations  See scanned document for BG tracing documentation  Fi[employer]tt personal CGMS data downloaded and reviewed. This was separate service provided- CGM interpretation    Average glucose 124 ± 26. 1SD  Time in range: 92 %  Time above 180: 6 %  Time under 70: 2 %     Basal pattern review: Basal normoglycemia  Postprandial pattern review: postprandial herglycemia at times  Hypoglycemia review: hypoglycemia after hyperglycemia  Activity related review: Not recorded      Based on the data, I recommend:    1. Continue same medications    2. Hypoglycemia management    3. Make healthy food choices      These diagnosis were discussed and reviewed with the patient including the advantages of drug therapy. She was counseled at this visit on the following: diabetes complication prevention and foot care. Return in about 6 months (around 11/24/2022) for diabetes.     Electronically signed by Gasper Hale MD on 5/24/2022 at 7:31 AM

## 2022-07-05 RX ORDER — FLASH GLUCOSE SENSOR
KIT MISCELLANEOUS
Qty: 2 EACH | Refills: 3 | Status: SHIPPED | OUTPATIENT
Start: 2022-07-05

## 2022-09-12 RX ORDER — GLIPIZIDE 5 MG/1
TABLET, FILM COATED, EXTENDED RELEASE ORAL
Qty: 120 TABLET | Refills: 1 | Status: SHIPPED | OUTPATIENT
Start: 2022-09-12 | End: 2022-11-11

## 2022-10-04 RX ORDER — METFORMIN HYDROCHLORIDE 500 MG/1
500 TABLET, EXTENDED RELEASE ORAL
Qty: 90 TABLET | Refills: 0 | Status: SHIPPED | OUTPATIENT
Start: 2022-10-04

## 2022-10-04 RX ORDER — DULAGLUTIDE 0.75 MG/.5ML
0.75 INJECTION, SOLUTION SUBCUTANEOUS WEEKLY
Qty: 12 ADJUSTABLE DOSE PRE-FILLED PEN SYRINGE | Refills: 0 | Status: SHIPPED | OUTPATIENT
Start: 2022-10-04 | End: 2022-11-21 | Stop reason: DRUGHIGH

## 2022-11-11 RX ORDER — GLIPIZIDE 5 MG/1
TABLET, FILM COATED, EXTENDED RELEASE ORAL
Qty: 120 TABLET | Refills: 0 | Status: SHIPPED | OUTPATIENT
Start: 2022-11-11 | End: 2022-12-12

## 2022-11-15 ENCOUNTER — HOSPITAL ENCOUNTER (OUTPATIENT)
Dept: WOMENS IMAGING | Age: 57
Discharge: HOME | End: 2022-11-15
Payer: COMMERCIAL

## 2022-11-15 DIAGNOSIS — Z12.31 VISIT FOR SCREENING MAMMOGRAM: ICD-10-CM

## 2022-11-15 DIAGNOSIS — E06.3 HASHIMOTO'S THYROIDITIS: ICD-10-CM

## 2022-11-15 DIAGNOSIS — E11.40 TYPE 2 DIABETES, CONTROLLED, WITH NEUROPATHY (HCC): ICD-10-CM

## 2022-11-15 DIAGNOSIS — E78.2 MIXED HYPERLIPIDEMIA: ICD-10-CM

## 2022-11-15 DIAGNOSIS — E66.01 CLASS 2 SEVERE OBESITY WITH SERIOUS COMORBIDITY AND BODY MASS INDEX (BMI) OF 38.0 TO 38.9 IN ADULT, UNSPECIFIED OBESITY TYPE (HCC): ICD-10-CM

## 2022-11-15 LAB
A/G RATIO: 1.9 (ref 1.1–2.2)
ALBUMIN SERPL-MCNC: 4.5 G/DL (ref 3.4–5)
ALP BLD-CCNC: 102 U/L (ref 40–129)
ALT SERPL-CCNC: 15 U/L (ref 10–40)
ANION GAP SERPL CALCULATED.3IONS-SCNC: 14 MMOL/L (ref 3–16)
AST SERPL-CCNC: 13 U/L (ref 15–37)
BILIRUB SERPL-MCNC: 0.4 MG/DL (ref 0–1)
BUN BLDV-MCNC: 18 MG/DL (ref 7–20)
CALCIUM SERPL-MCNC: 9.4 MG/DL (ref 8.3–10.6)
CHLORIDE BLD-SCNC: 104 MMOL/L (ref 99–110)
CHOLESTEROL, TOTAL: 134 MG/DL (ref 0–199)
CO2: 21 MMOL/L (ref 21–32)
CREAT SERPL-MCNC: 0.9 MG/DL (ref 0.6–1.1)
ESTIMATED AVERAGE GLUCOSE: 168.6 MG/DL
GFR SERPL CREATININE-BSD FRML MDRD: >60 ML/MIN/{1.73_M2}
GLUCOSE BLD-MCNC: 152 MG/DL (ref 70–99)
HBA1C MFR BLD: 7.5 %
HDLC SERPL-MCNC: 37 MG/DL (ref 40–60)
LDL CHOLESTEROL CALCULATED: 79 MG/DL
POTASSIUM SERPL-SCNC: 4.5 MMOL/L (ref 3.5–5.1)
SODIUM BLD-SCNC: 139 MMOL/L (ref 136–145)
T3 FREE: 2.7 PG/ML (ref 2.3–4.2)
T4 FREE: 1.1 NG/DL (ref 0.9–1.8)
TOTAL PROTEIN: 6.9 G/DL (ref 6.4–8.2)
TRIGL SERPL-MCNC: 91 MG/DL (ref 0–150)
TSH SERPL DL<=0.05 MIU/L-ACNC: 2.44 UIU/ML (ref 0.27–4.2)
VLDLC SERPL CALC-MCNC: 18 MG/DL

## 2022-11-15 PROCEDURE — 77067 SCR MAMMO BI INCL CAD: CPT

## 2022-11-20 PROBLEM — E66.9 CLASS 2 OBESITY WITH BODY MASS INDEX (BMI) OF 36.0 TO 36.9 IN ADULT: Status: ACTIVE | Noted: 2022-11-20

## 2022-11-20 PROBLEM — E11.40 POORLY CONTROLLED TYPE 2 DIABETES MELLITUS WITH NEUROPATHY (HCC): Status: ACTIVE | Noted: 2022-11-20

## 2022-11-20 PROBLEM — E11.65 POORLY CONTROLLED TYPE 2 DIABETES MELLITUS WITH NEUROPATHY (HCC): Status: ACTIVE | Noted: 2022-11-20

## 2022-11-20 PROBLEM — E66.812 CLASS 2 OBESITY WITH BODY MASS INDEX (BMI) OF 36.0 TO 36.9 IN ADULT: Status: ACTIVE | Noted: 2022-11-20

## 2022-11-20 LAB — IODINE, SERUM: 47.2 UG/L (ref 40–92)

## 2022-11-21 ENCOUNTER — OFFICE VISIT (OUTPATIENT)
Dept: ENDOCRINOLOGY | Age: 57
End: 2022-11-21
Payer: COMMERCIAL

## 2022-11-21 VITALS
OXYGEN SATURATION: 98 % | WEIGHT: 223 LBS | SYSTOLIC BLOOD PRESSURE: 128 MMHG | HEART RATE: 88 BPM | DIASTOLIC BLOOD PRESSURE: 72 MMHG | BODY MASS INDEX: 37.11 KG/M2

## 2022-11-21 DIAGNOSIS — E06.3 HASHIMOTO'S THYROIDITIS: ICD-10-CM

## 2022-11-21 DIAGNOSIS — E66.01 CLASS 2 SEVERE OBESITY WITH SERIOUS COMORBIDITY AND BODY MASS INDEX (BMI) OF 36.0 TO 36.9 IN ADULT, UNSPECIFIED OBESITY TYPE (HCC): ICD-10-CM

## 2022-11-21 DIAGNOSIS — E11.40 POORLY CONTROLLED TYPE 2 DIABETES MELLITUS WITH NEUROPATHY (HCC): Primary | ICD-10-CM

## 2022-11-21 DIAGNOSIS — E78.2 MIXED HYPERLIPIDEMIA: ICD-10-CM

## 2022-11-21 DIAGNOSIS — E11.65 POORLY CONTROLLED TYPE 2 DIABETES MELLITUS WITH NEUROPATHY (HCC): Primary | ICD-10-CM

## 2022-11-21 PROCEDURE — 3051F HG A1C>EQUAL 7.0%<8.0%: CPT | Performed by: INTERNAL MEDICINE

## 2022-11-21 PROCEDURE — 99214 OFFICE O/P EST MOD 30 MIN: CPT | Performed by: INTERNAL MEDICINE

## 2022-11-21 PROCEDURE — 95251 CONT GLUC MNTR ANALYSIS I&R: CPT | Performed by: INTERNAL MEDICINE

## 2022-11-21 RX ORDER — PROPRANOLOL HYDROCHLORIDE 10 MG/1
10 TABLET ORAL 2 TIMES DAILY
COMMUNITY

## 2022-11-21 RX ORDER — BUPROPION HYDROCHLORIDE 100 MG/1
100 TABLET ORAL 2 TIMES DAILY
COMMUNITY

## 2022-11-21 RX ORDER — DULAGLUTIDE 1.5 MG/.5ML
1.5 INJECTION, SOLUTION SUBCUTANEOUS WEEKLY
Qty: 4 ADJUSTABLE DOSE PRE-FILLED PEN SYRINGE | Refills: 3 | Status: SHIPPED | OUTPATIENT
Start: 2022-11-21

## 2022-11-21 NOTE — PROGRESS NOTES
Maninder Goff is a 56 y.o. female who is being evaluated for Type 2 diabetes mellitus. Current symptoms/problems include  hyperglycemia  and are worsening. Poorly controlled type 2 diabetes mellitus with neuropathy (HCC) [E11.40, E11.65]    Diagnosed with Type 2 diabetes mellitus in 2018. Comorbid conditions:  hyperlipidemia, obesity and Neuropathy     Current diabetic medications include: glipizide ER 5 mg 2 tabs bid, Metformin  mg 1 tab in AM, Trulicity 6.369 mg weekly     Intolerance to diabetes medications: Yes Metformin higher dose caused diarrhea  Ozempic, Januvia too expensive     Weight trend: stable  Prior visit with dietician: yes  Current diet: on average, 3 meals per day  Current exercise: no     Current monitoring regimen: home blood tests - 2 times daily  Has brought blood glucose log/meter:  Yes  Home blood sugar records: fasting range: 100-120 and postprandial range: 100-140  Any episodes of hypoglycemia? No  Hypoglycemia frequency and time(s):    Does patient have Glucagon emergency kit? No  Does patient have rapid acting carbohydrate? No  Does patient wear a medic alert bracelet or necklace? No     2. Mixed hyperlipidemia  Has muscle pain     3. Obesity  Trying to eat healthier  No exercise. No difficulty swallowing, voice change  No history of radiation to her neck  No family history of thyroid cancer     4.  Hashimoto's thyroiditis  Has fatigue, dry skin, thinner hair    Past Medical History:   Diagnosis Date    Anxiety     Diabetes mellitus ([facility] [address]     Hyperlipidemia       Patient Active Problem List   Diagnosis    Mixed hyperlipidemia    Class 2 obesity with body mass index (BMI) of 38.0 to 38.9 in adult    Hashimoto's thyroiditis    Type 2 diabetes, controlled, with neuropathy (HCC)    Class 2 obesity with body mass index (BMI) of 36.0 to 36.9 in adult    Poorly controlled type 2 diabetes mellitus with neuropathy [facility])     Past Surgical History:   Procedure Laterality Date HYSTERECTOMY (CERVIX STATUS UNKNOWN)      LITHOTRIPSY      x8     Social History     Socioeconomic History    Marital status:      Spouse name: Not on file    Number of children: Not on file    Years of education: Not on file    Highest education level: Not on file   Occupational History    Not on file   Tobacco Use    Smoking status: Never    Smokeless tobacco: Never   Vaping Use    Vaping Use: Never used   Substance and Sexual Activity    Alcohol use: Not Currently    Drug use: Yes     Types: Marijuana (Anibal Michael)     Comment: occasional    Sexual activity: Yes     Partners: Male   Other Topics Concern    Not on file   Social History Narrative    Not on file     Social Determinants of Health     Financial Resource Strain: Not on file   Food Insecurity: Not on file   Transportation Needs: Not on file   Physical Activity: Not on file   Stress: Not on file   Social Connections: Not on file   Intimate Partner Violence: Not on file   Housing Stability: Not on file     Family History   Problem Relation Age of Onset    Diabetes type 2  Mother     Cancer Mother         pancreas    Cancer Father         esophagus    Diabetes type 2  Maternal Grandmother     Diabetes type 2  Maternal Grandfather     Diabetes Type 1  Daughter      Current Outpatient Medications   Medication Sig Dispense Refill    buPROPion (WELLBUTRIN) 100 MG tablet Take 100 mg by mouth 2 times daily      propranolol (INDERAL) 10 MG tablet Take 10 mg by mouth 2 times daily      dulaglutide (TRULICITY) 1.5 NH/0.1NW SC injection Inject 0.5 mLs into the skin once a week 4 Adjustable Dose Pre-filled Pen Syringe 3    glipiZIDE (GLUCOTROL XL) 5 MG extended release tablet TAKE 2 TABLETS BY MOUTH TWICE DAILY 120 tablet 0    metFORMIN (GLUCOPHAGE-XR) 500 MG extended release tablet Take 1 tablet by mouth daily (with breakfast) 90 tablet 0    Continuous Blood Gluc Sensor (FREESTYLE DAVID 2 SENSOR) MISC CHANGE EVERY 14 DAYS 2 each 3    lamoTRIgine (LAMICTAL) 100 MG tablet Take 100 mg by mouth daily      OMEPRAZOLE PO Take by mouth      ALPRAZolam (XANAX) 0.25 MG tablet TAKE 1 TO 2 TABLETS BY MOUTH EVERY DAY AS NEEDED FOR EXTREME ANXIETY      atorvastatin (LIPITOR) 20 MG tablet Take 20 mg by mouth daily       lithium 300 MG capsule TAKE 1 CAPSULE BY MOUTH EVERY MORNING       No current facility-administered medications for this visit.      No Known Allergies  Family Status   Relation Name Status    Mother      Father      MGM  (Not Specified)    MGF  (Not Specified)    Karen  (Not Specified)       Lab Review:    No results found for: WBC, HGB, HCT, MCV, PLT  Lab Results   Component Value Date/Time     11/15/2022 07:52 AM    K 4.5 11/15/2022 07:52 AM     11/15/2022 07:52 AM    CO2 21 11/15/2022 07:52 AM    BUN 18 11/15/2022 07:52 AM    CREATININE 0.9 11/15/2022 07:52 AM    GLUCOSE 152 11/15/2022 07:52 AM    CALCIUM 9.4 11/15/2022 07:52 AM    PROT 6.9 11/15/2022 07:52 AM    LABALBU 4.5 11/15/2022 07:52 AM    BILITOT 0.4 11/15/2022 07:52 AM    ALKPHOS 102 11/15/2022 07:52 AM    AST 13 11/15/2022 07:52 AM    ALT 15 11/15/2022 07:52 AM    LABGLOM >60 11/15/2022 07:52 AM    GFRAA >60 2022 07:47 AM    AGRATIO 1.9 11/15/2022 07:52 AM     Lab Results   Component Value Date/Time    TSH 2.44 11/15/2022 07:52 AM    FT3 2.7 11/15/2022 07:52 AM     Lab Results   Component Value Date/Time    LABA1C 7.5 11/15/2022 07:52 AM     Lab Results   Component Value Date/Time    .6 11/15/2022 07:52 AM     Lab Results   Component Value Date/Time    CHOL 134 11/15/2022 07:52 AM     Lab Results   Component Value Date/Time    TRIG 91 11/15/2022 07:52 AM     Lab Results   Component Value Date/Time    HDL 37 11/15/2022 07:52 AM     Lab Results   Component Value Date/Time    LDLCALC 79 11/15/2022 07:52 AM     Lab Results   Component Value Date/Time    LABVLDL 18 11/15/2022 07:52 AM     No results found for: [facility]  Lab Results   Component Value Date/Time    LABMICR 2.20 02/14/2022 08:21 AM     No results found for: VITD25     Review of Systems:  Constitutional: no fatigue, no fever, no recent weight gain, no recent weight loss, no changes in appetite  Eyes: no eye pain, has change in vision, no eye redness, no eye irritation, no double vision  Ears, nose, throat: no nasal congestion, no sore throat, no earache, no decrease in hearing, no hoarseness, no dry mouth, no sinus problems, no difficulty swallowing, no neck lumps, no dental problems, no mouth sores, no ringing in ears  Pulmonary: no shortness of breath, no wheezing, no dyspnea on exertion, no cough  Cardiovascular: no chest pain, no lower extremity edema, no orthopnea, no intermittent leg claudication, no palpitations  Gastrointestinal: no abdominal pain, no nausea, no vomiting, no diarrhea, no constipation, no dysphagia, no heartburn, no bloating  Genitourinary: no dysuria, no urinary incontinence, no urinary hesitancy, no urinary frequency, no feelings of urinary urgency, has nocturia  Musculoskeletal: no joint swelling, no joint stiffness, no joint pain, no muscle cramps, no muscle pain  Integument/Breast: no hair loss, no skin rashes, no skin lesions, no itching, has dry skin  Neurological: no numbness, no tingling, no weakness, no confusion, has headaches, no dizziness, no fainting, no tremors, no decrease in memory, no balance problems  Psychiatric: has anxiety, has depression, has insomnia  Hematologic/Lymphatic: no tendency for easy bleeding, no swollen lymph nodes, no tendency for easy bruising  Immunology: no seasonal allergies, no frequent infections, no frequent illnesses  Endocrine: no temperature intolerance    /72   Pulse 88   Wt 223 lb (101.2 kg)   SpO2 98%   BMI 37.11 kg/m²    Wt Readings from Last 3 Encounters:   11/21/22 223 lb (101.2 kg)   05/24/22 217 lb 12.8 oz (98.8 kg)   08/17/21 233 lb (105.7 kg)     Body mass index is 37.11 kg/m².       OBJECTIVE:  Constitutional: no acute distress, well appearing and well nourished  Psychiatric: oriented to person, place and time, judgement and insight and normal, recent and remote memory and intact and mood and affect are normal  Skin: skin and subcutaneous tissue is normal without mass, normal turgor  Head and Face: examination of head and face revealed no abnormalities  Eyes: no lid or conjunctival swelling, erythema or discharge, pupils are normal, equal, round, reactive to light  Ears/Nose: external inspection of ears and nose revealed no abnormalities, hearing is grossly normal  Oropharynx/Mouth/Face: lips, tongue and gums are normal with no lesions, the voice quality was normal  Neck: neck is supple and symmetric, with midline trachea and no masses, thyroid is enlarged, more on the left  Lymphatics: normal cervical lymph nodes, normal supraclavicular nodes  Pulmonary: no increased work of breathing or signs of respiratory distress, lungs are clear to auscultation  Cardiovascular: normal heart rate and rhythm, normal S1 and S2, no murmurs and pedal pulses and 2+ bilaterally, No edema  Abdomen: abdomen is soft, non-tender with no masses  Musculoskeletal: normal gait and station and exam of the digits and nails are normal  Neurological: normal coordination and normal general cortical function    Foot exam 8/2021:  Visual inspection:  Deformity/amputation: absent  Skin lesions/pre-ulcerative calluses: present calluses  Edema: right- negative, left- negative    Sensory exam:  Monofilament sensation: normal  (minimum of 5 random plantar locations tested, avoiding callused areas - > 1 area with absence of sensation is + for neuropathy)    Plus at least one of the following:  Pulses: normal  Proprioception: Intact  Vibration (128 Hz): Impaired    ASSESSMENT/PLAN:    1. Type 2 diabetes mellitus, poorly controlled, with neuropathy ([facility] [address]  Will do foot exam next appointment.   Continue Richar  Increase Trulicity to 1.5 mg weekly, increase as tolerated in 1 month  Hemoglobin A1c 10.6-8.7-6.7-7.5  C-peptide 8.1, glucose 222  Check BG 3-4 times daily  Dietitian consultation when patient agrees  I discussed insulin regimen. Continue diet and exercise. - metFORMIN (GLUCOPHAGE) 500 MG ER tablet; 1 tab every morning   - glipiZIDE (GLUCOTROL XL) 5 MG extended release tablet; 2 tabs bid    - Hemoglobin A1C; Future  - Comprehensive Metabolic Panel; Future  - Lipid Panel; Future  - Microalbumin / Creatinine Urine Ratio; Future  - [facility] Individual Diabetes Education (Non Care Coord Patient)[facility]     2. Mixed hyperlipidemia  LDL 84-69-79  Triglycerides 196-108-91  HDL 43-41-37  TSH 3.94-3.00-2. 44  - atorvastatin (LIPITOR) 20 MG tablet  - Comprehensive Metabolic Panel; Future  - Lipid Panel; Future     3. Obesity  Diet, exercise     4. Hashimoto's thyroiditis  TSH 3.94-3.0-2.44  Iodine 47.2  - T3, Free; Future  - T4, Free; Future  - TSH without Reflex; Future       Reviewed and/or ordered clinical lab results Yes  Reviewed and/or ordered radiology tests Yes  Reviewed and/or ordered other diagnostic tests No  Discussed test results with performing physician No  Independently reviewed image, tracing, or specimen No  Made a decision to obtain old records No  Reviewed and summarized old records Yes   HbA1C 10.6    LDL 93  0.81  Obtained history from other than patient No    Jamarcus Collazo was counseled regarding symptoms of diabetes, thyroid disease diagnosis, course and complications of disease if inadequately treated, side effects of medications, diagnosis, treatment options, and prognosis, risks, benefits, complications, and alternatives of treatment, labs, imaging and other studies and treatment targets and goals, medications available for treatment, side effects, benefits, complication prevention, basal and prandial glucose production and insulin requirements,. She understands instructions and counseling.     CGMS Download Review and Recommendations  See scanned document for BG tracing documentation  ADARTIS personal CGMS data downloaded and reviewed. This was separate service provided- CGM interpretation    Average glucose 146 ± 24. 2SD  Time in range: 83 %  Time above 180: 17 %  Time under 70: 0%   GMI 6.8    Basal pattern review: Basal normoglycemia  Postprandial pattern review: postprandial herglycemia   Hypoglycemia review: hypoglycemia after hyperglycemia  Activity related review: Not recorded      Based on the data, I recommend:    1. Increase Trulicity    2. Hypoglycemia management    3. Make healthy food choices      These diagnosis were discussed and reviewed with the patient including the advantages of drug therapy. She was counseled at this visit on the following: diabetes complication prevention and foot care. Return in about 3 months (around 2/21/2023) for diabetes.     Electronically signed by Radha Leo MD on 11/21/2022 at 7:32 AM

## 2022-12-12 RX ORDER — GLIPIZIDE 5 MG/1
TABLET, FILM COATED, EXTENDED RELEASE ORAL
Qty: 120 TABLET | Refills: 2 | Status: SHIPPED | OUTPATIENT
Start: 2022-12-12

## 2023-01-09 RX ORDER — METFORMIN HYDROCHLORIDE 500 MG/1
TABLET, EXTENDED RELEASE ORAL
Qty: 90 TABLET | Refills: 0 | Status: SHIPPED | OUTPATIENT
Start: 2023-01-09

## 2023-02-21 ENCOUNTER — TELEPHONE (OUTPATIENT)
Dept: ENDOCRINOLOGY | Age: 58
End: 2023-02-21

## 2023-02-21 DIAGNOSIS — E11.40 POORLY CONTROLLED TYPE 2 DIABETES MELLITUS WITH NEUROPATHY (HCC): Primary | ICD-10-CM

## 2023-02-21 DIAGNOSIS — E11.65 POORLY CONTROLLED TYPE 2 DIABETES MELLITUS WITH NEUROPATHY (HCC): Primary | ICD-10-CM

## 2023-02-21 RX ORDER — DULAGLUTIDE 0.75 MG/.5ML
INJECTION, SOLUTION SUBCUTANEOUS
Qty: 6 ML | Refills: 0 | Status: SHIPPED | OUTPATIENT
Start: 2023-02-21 | End: 2023-02-23 | Stop reason: SDUPTHER

## 2023-02-21 NOTE — TELEPHONE ENCOUNTER
Patient called requesting a refill     Pt stated that Rx Trulicity 1.5 mg has been giving stomach issues.  She is wanting to know if Dr. Keshia Cardona could lower her dosage to 2.55 mg     Rx- Trulicity 4.24 mg    Smitha Ahumada Dr- 11/21/22  NOV- 3/16/23    Please advise

## 2023-02-21 NOTE — TELEPHONE ENCOUNTER
NOTED. Sent lower rx for 0.75 mg, D/c'd rx for 1.5mg    FYI  Pt c/o bloating and belching. C/o Nause and headaches. Would last days 2-5 after injection.

## 2023-03-14 DIAGNOSIS — E11.40 POORLY CONTROLLED TYPE 2 DIABETES MELLITUS WITH NEUROPATHY (HCC): ICD-10-CM

## 2023-03-14 DIAGNOSIS — E78.2 MIXED HYPERLIPIDEMIA: ICD-10-CM

## 2023-03-14 DIAGNOSIS — E11.65 POORLY CONTROLLED TYPE 2 DIABETES MELLITUS WITH NEUROPATHY (HCC): ICD-10-CM

## 2023-03-14 DIAGNOSIS — E06.3 HASHIMOTO'S THYROIDITIS: ICD-10-CM

## 2023-03-14 LAB
ALBUMIN SERPL-MCNC: 4.6 G/DL (ref 3.4–5)
ALBUMIN/GLOB SERPL: 1.6 {RATIO} (ref 1.1–2.2)
ALP SERPL-CCNC: 99 U/L (ref 40–129)
ALT SERPL-CCNC: 17 U/L (ref 10–40)
ANION GAP SERPL CALCULATED.3IONS-SCNC: 12 MMOL/L (ref 3–16)
AST SERPL-CCNC: 13 U/L (ref 15–37)
BILIRUB SERPL-MCNC: 0.4 MG/DL (ref 0–1)
BUN SERPL-MCNC: 16 MG/DL (ref 7–20)
CALCIUM SERPL-MCNC: 10 MG/DL (ref 8.3–10.6)
CHLORIDE SERPL-SCNC: 105 MMOL/L (ref 99–110)
CHOLEST SERPL-MCNC: 152 MG/DL (ref 0–199)
CO2 SERPL-SCNC: 23 MMOL/L (ref 21–32)
CREAT SERPL-MCNC: 0.9 MG/DL (ref 0.6–1.1)
CREAT UR-MCNC: 149.4 MG/DL (ref 28–259)
EST. AVERAGE GLUCOSE BLD GHB EST-MCNC: 154.2 MG/DL
GFR SERPLBLD CREATININE-BSD FMLA CKD-EPI: >60 ML/MIN/{1.73_M2}
GLUCOSE SERPL-MCNC: 136 MG/DL (ref 70–99)
HBA1C MFR BLD: 7 %
HDLC SERPL-MCNC: 44 MG/DL (ref 40–60)
LDLC SERPL CALC-MCNC: 80 MG/DL
MICROALBUMIN UR DL<=1MG/L-MCNC: <1.2 MG/DL
MICROALBUMIN/CREAT UR: NORMAL MG/G (ref 0–30)
POTASSIUM SERPL-SCNC: 4.6 MMOL/L (ref 3.5–5.1)
PROT SERPL-MCNC: 7.5 G/DL (ref 6.4–8.2)
SODIUM SERPL-SCNC: 140 MMOL/L (ref 136–145)
T3FREE SERPL-MCNC: 3.3 PG/ML (ref 2.3–4.2)
T4 FREE SERPL-MCNC: 1.3 NG/DL (ref 0.9–1.8)
TRIGL SERPL-MCNC: 140 MG/DL (ref 0–150)
TSH SERPL DL<=0.005 MIU/L-ACNC: 3.16 UIU/ML (ref 0.27–4.2)
VLDLC SERPL CALC-MCNC: 28 MG/DL

## 2023-03-16 ENCOUNTER — OFFICE VISIT (OUTPATIENT)
Dept: ENDOCRINOLOGY | Age: 58
End: 2023-03-16
Payer: COMMERCIAL

## 2023-03-16 VITALS
TEMPERATURE: 98 F | OXYGEN SATURATION: 94 % | RESPIRATION RATE: 14 BRPM | BODY MASS INDEX: 37.99 KG/M2 | WEIGHT: 228 LBS | HEART RATE: 73 BPM | SYSTOLIC BLOOD PRESSURE: 139 MMHG | DIASTOLIC BLOOD PRESSURE: 84 MMHG | HEIGHT: 65 IN

## 2023-03-16 DIAGNOSIS — E66.9 CLASS 2 OBESITY WITH BODY MASS INDEX (BMI) OF 37.0 TO 37.9 IN ADULT, UNSPECIFIED OBESITY TYPE, UNSPECIFIED WHETHER SERIOUS COMORBIDITY PRESENT: ICD-10-CM

## 2023-03-16 DIAGNOSIS — E11.40 TYPE 2 DIABETES, CONTROLLED, WITH NEUROPATHY (HCC): Primary | ICD-10-CM

## 2023-03-16 DIAGNOSIS — E78.2 MIXED HYPERLIPIDEMIA: ICD-10-CM

## 2023-03-16 DIAGNOSIS — E06.3 HASHIMOTO'S THYROIDITIS: ICD-10-CM

## 2023-03-16 PROBLEM — E66.812 CLASS 2 OBESITY WITH BODY MASS INDEX (BMI) OF 37.0 TO 37.9 IN ADULT: Status: ACTIVE | Noted: 2023-03-16

## 2023-03-16 PROCEDURE — 99214 OFFICE O/P EST MOD 30 MIN: CPT | Performed by: INTERNAL MEDICINE

## 2023-03-16 PROCEDURE — 3051F HG A1C>EQUAL 7.0%<8.0%: CPT | Performed by: INTERNAL MEDICINE

## 2023-03-16 RX ORDER — GLIPIZIDE 5 MG/1
TABLET, FILM COATED, EXTENDED RELEASE ORAL
Qty: 180 TABLET | Refills: 1 | Status: SHIPPED | OUTPATIENT
Start: 2023-03-16

## 2023-03-16 RX ORDER — BLOOD-GLUCOSE SENSOR
EACH MISCELLANEOUS
Qty: 6 EACH | Refills: 1 | Status: SHIPPED | OUTPATIENT
Start: 2023-03-16

## 2023-03-16 RX ORDER — METFORMIN HYDROCHLORIDE 500 MG/1
TABLET, EXTENDED RELEASE ORAL
Qty: 90 TABLET | Refills: 1 | Status: SHIPPED | OUTPATIENT
Start: 2023-03-16

## 2023-03-16 NOTE — PROGRESS NOTES
Nathan Recinos is a 56 y.o. female who is being evaluated for Type 2 diabetes mellitus. Current symptoms/problems include  hyperglycemia  and are worsening. Type 2 diabetes, controlled, with neuropathy ([address] [E11.40]    Diagnosed with Type 2 diabetes mellitus in 2018. Comorbid conditions:  hyperlipidemia, obesity and Neuropathy     Current diabetic medications include: glipizide ER 5 mg 2 tabs bid, Metformin  mg 1 tab in AM, Trulicity 5.521 mg weekly     Intolerance to diabetes medications: Yes Metformin higher dose caused diarrhea  Trulicity 1.5 mg too much upset stomach, could not tolerate  Ozempic, Januvia too expensive. Weight trend: stable  Prior visit with dietician: yes  Current diet: on average, 3 meals per day  Current exercise: no     Current monitoring regimen: home blood tests - 2 times daily  Has brought blood glucose log/meter:  Yes  Home blood sugar records: fasting range: 100-120 and postprandial range: 100-140  Any episodes of hypoglycemia? No  Hypoglycemia frequency and time(s):    Does patient have Glucagon emergency kit? No  Does patient have rapid acting carbohydrate? No  Does patient wear a medic alert bracelet or necklace? No     2. Mixed hyperlipidemia  Has muscle pain     3. Obesity  Trying to eat healthier  No exercise. No difficulty swallowing, voice change  No history of radiation to her neck  No family history of thyroid cancer     4. Hashimoto's thyroiditis  Has fatigue, dry skin, thinner hair      EXAM: US THYROID        INDICATION:  Goiter       COMPARISON:  None       TECHNIQUE:  Multiplanar grayscale analysis of the thyroid was performed. FINDINGS:       Isthmus: 3 mm in anteroposterior dimension. Right Lobe: 4.5 x 2.3 x 1.4 cm. Left Lobe: 4.1 x 2.0 x 1.6 cm. Other: The thyroid gland is diffusely heterogeneous in echogenicity, but no discrete cyst or nodule identified.                Impression       Heterogeneous thyroid gland, but no suspicious nodules.     Past Medical History:   Diagnosis Date    Anxiety     Diabetes mellitus (HCC)     Hyperlipidemia       Patient Active Problem List   Diagnosis    Mixed hyperlipidemia    Class 2 obesity with body mass index (BMI) of 38.0 to 38.9 in adult    Hashimoto's thyroiditis    Type 2 diabetes, controlled, with neuropathy (HCC)    Class 2 obesity with body mass index (BMI) of 36.0 to 36.9 in adult    Poorly controlled type 2 diabetes mellitus with neuropathy (HCC)    Class 2 obesity with body mass index (BMI) of 37.0 to 37.9 in adult     Past Surgical History:   Procedure Laterality Date    HYSTERECTOMY (CERVIX STATUS UNKNOWN)      LITHOTRIPSY      x8     Social History     Socioeconomic History    Marital status:      Spouse name: Not on file    Number of children: Not on file    Years of education: Not on file    Highest education level: Not on file   Occupational History    Not on file   Tobacco Use    Smoking status: Never    Smokeless tobacco: Never   Vaping Use    Vaping Use: Never used   Substance and Sexual Activity    Alcohol use: Not Currently    Drug use: Yes     Types: Marijuana (Weed)     Comment: occasional    Sexual activity: Yes     Partners: Male   Other Topics Concern    Not on file   Social History Narrative    Not on file     Social Determinants of Health     Financial Resource Strain: Not on file   Food Insecurity: Not on file   Transportation Needs: Not on file   Physical Activity: Not on file   Stress: Not on file   Social Connections: Not on file   Intimate Partner Violence: Not on file   Housing Stability: Not on file     Family History   Problem Relation Age of Onset    Diabetes type 2  Mother     Cancer Mother         pancreas    Cancer Father         esophagus    Diabetes type 2  Maternal Grandmother     Diabetes type 2  Maternal Grandfather     Diabetes Type 1  Daughter      Current Outpatient Medications   Medication Sig Dispense Refill    Continuous Blood  Gluc Sensor (FREESTYLE DAVID 2 SENSOR) MISC CHANGE EVERY 14 DAYS 2 each 0    Dulaglutide (TRULICITY) 0.75 MG/0.5ML SOPN INJECT 0.5ML SUBQ Q WEEK 6 mL 0    metFORMIN (GLUCOPHAGE-XR) 500 MG extended release tablet TAKE 1 TABLET BY MOUTH DAILY WITH BREAKFAST 90 tablet 0    glipiZIDE (GLUCOTROL XL) 5 MG extended release tablet TAKE 2 TABLETS BY MOUTH TWICE DAILY 120 tablet 2    buPROPion (WELLBUTRIN) 100 MG tablet Take 100 mg by mouth 2 times daily      propranolol (INDERAL) 10 MG tablet Take 10 mg by mouth 2 times daily      lamoTRIgine (LAMICTAL) 100 MG tablet Take 100 mg by mouth daily      OMEPRAZOLE PO Take by mouth      ALPRAZolam (XANAX) 0.25 MG tablet TAKE 1 TO 2 TABLETS BY MOUTH EVERY DAY AS NEEDED FOR EXTREME ANXIETY      atorvastatin (LIPITOR) 20 MG tablet Take 20 mg by mouth daily        No current facility-administered medications for this visit.     No Known Allergies  Family Status   Relation Name Status    Mother      Father      MGM  (Not Specified)    MGF  (Not Specified)    Karen  (Not Specified)       Lab Review:    No results found for: WBC, HGB, HCT, MCV, PLT  Lab Results   Component Value Date/Time     2023 07:50 AM    K 4.6 2023 07:50 AM     2023 07:50 AM    CO2 23 2023 07:50 AM    BUN 16 2023 07:50 AM    CREATININE 0.9 2023 07:50 AM    GLUCOSE 136 2023 07:50 AM    CALCIUM 10.0 2023 07:50 AM    PROT 7.5 2023 07:50 AM    LABALBU 4.6 2023 07:50 AM    BILITOT 0.4 2023 07:50 AM    ALKPHOS 99 2023 07:50 AM    AST 13 2023 07:50 AM    ALT 17 2023 07:50 AM    LABGLOM >60 2023 07:50 AM    GFRAA >60 2022 07:47 AM    AGRATIO 1.6 2023 07:50 AM     Lab Results   Component Value Date/Time    TSH 3.16 2023 07:50 AM    FT3 3.3 2023 07:50 AM     Lab Results   Component Value Date/Time    LABA1C 7.0 2023 07:50 AM     Lab Results   Component Value Date/Time    .2  03/14/2023 07:50 AM     Lab Results   Component Value Date/Time    CHOL 152 03/14/2023 07:50 AM     Lab Results   Component Value Date/Time    TRIG 140 03/14/2023 07:50 AM     Lab Results   Component Value Date/Time    HDL 44 03/14/2023 07:50 AM     Lab Results   Component Value Date/Time    LDLCALC 80 03/14/2023 07:50 AM     Lab Results   Component Value Date/Time    LABVLDL 28 03/14/2023 07:50 AM     No results found for: CHOLHDLRATIO  Lab Results   Component Value Date/Time    LABMICR <1.20 03/14/2023 07:51 AM     No results found for: VITD25     Review of Systems:  Constitutional: no fatigue, no fever, no recent weight gain, no recent weight loss, no changes in appetite  Eyes: no eye pain, has change in vision, no eye redness, no eye irritation, no double vision  Ears, nose, throat: no nasal congestion, no sore throat, no earache, no decrease in hearing, no hoarseness, no dry mouth, no sinus problems, no difficulty swallowing, no neck lumps, no dental problems, no mouth sores, no ringing in ears  Pulmonary: no shortness of breath, no wheezing, no dyspnea on exertion, no cough  Cardiovascular: no chest pain, no lower extremity edema, no orthopnea, no intermittent leg claudication, no palpitations  Gastrointestinal: no abdominal pain, no nausea, no vomiting, no diarrhea, no constipation, no dysphagia, no heartburn, no bloating  Genitourinary: no dysuria, no urinary incontinence, no urinary hesitancy, no urinary frequency, no feelings of urinary urgency, has nocturia  Musculoskeletal: no joint swelling, no joint stiffness, no joint pain, no muscle cramps, no muscle pain  Integument/Breast: no hair loss, no skin rashes, no skin lesions, no itching, has dry skin  Neurological: no numbness, no tingling, no weakness, no confusion, has headaches, no dizziness, no fainting, no tremors, no decrease in memory, no balance problems  Psychiatric: has anxiety, has depression, has insomnia  Hematologic/Lymphatic: no tendency for easy bleeding, no swollen lymph nodes, no tendency for easy bruising  Immunology: no seasonal allergies, no frequent infections, no frequent illnesses  Endocrine: no temperature intolerance    /84   Pulse 73   Temp 98 °F (36.7 °C)   Resp 14   Ht 5' 5\" (1.651 m)   Wt 228 lb (103.4 kg)   SpO2 94%   BMI 37.94 kg/m²    Wt Readings from Last 3 Encounters:   03/16/23 228 lb (103.4 kg)   11/21/22 223 lb (101.2 kg)   05/24/22 217 lb 12.8 oz (98.8 kg)     Body mass index is 37.94 kg/m².       OBJECTIVE:  Constitutional: no acute distress, well appearing and well nourished  Psychiatric: oriented to person, place and time, judgement and insight and normal, recent and remote memory and intact and mood and affect are normal  Skin: skin and subcutaneous tissue is normal without mass, normal turgor  Head and Face: examination of head and face revealed no abnormalities  Eyes: no lid or conjunctival swelling, erythema or discharge, pupils are normal, equal, round, reactive to light  Ears/Nose: external inspection of ears and nose revealed no abnormalities, hearing is grossly normal  Oropharynx/Mouth/Face: lips, tongue and gums are normal with no lesions, the voice quality was normal  Neck: neck is supple and symmetric, with midline trachea and no masses, thyroid is normal  Lymphatics: normal cervical lymph nodes, normal supraclavicular nodes  Pulmonary: no increased work of breathing or signs of respiratory distress, lungs are clear to auscultation  Cardiovascular: normal heart rate and rhythm, normal S1 and S2, no murmurs and pedal pulses and 2+ bilaterally, No edema  Abdomen: abdomen is soft, non-tender with no masses  Musculoskeletal: normal gait and station and exam of the digits and nails are normal  Neurological: normal coordination and normal general cortical function    Foot exam 8/2021:  Visual inspection:  Deformity/amputation: absent  Skin lesions/pre-ulcerative calluses: present calluses  Edema: right- negative, left- negative    Sensory exam:  Monofilament sensation: normal  (minimum of 5 random plantar locations tested, avoiding callused areas - > 1 area with absence of sensation is + for neuropathy)    Plus at least one of the following:  Pulses: normal  Proprioception: Intact  Vibration (128 Hz): Impaired    ASSESSMENT/PLAN:    1. Type 2 diabetes mellitus, controlled, with neuropathy (HCC)  Continue Richar  Continue Trulicity 3.948 mg weekly  Could not tolerate higher dose  Hemoglobin A1c 10.6-8.7-6.7-7.5-7.0  C-peptide 8.1, glucose 222  Check BG 3-4 times daily  Dietitian consultation when patient agrees  I discussed insulin regimen. Continue diet and exercise. - metFORMIN (GLUCOPHAGE) 500 MG ER tablet; 1 tab every morning   - glipiZIDE (GLUCOTROL XL) 5 MG extended release tablet; 2 tabs bid    - Hemoglobin A1C; Future  - Comprehensive Metabolic Panel; Future  - Lipid Panel; Future  - Microalbumin / Creatinine Urine Ratio; Future  - [facility] Individual Diabetes Education (Non Care Coord Patient)[facility]     2. Mixed hyperlipidemia  LDL 84-69-79-80  Triglycerides 497-122-  HDL 43-41-37-44  TSH 3.94-3.00-2. 44  - atorvastatin (LIPITOR) 20 MG tablet  - Comprehensive Metabolic Panel; Future  - Lipid Panel; Future     3. Obesity  Diet, exercise     4. Hashimoto's thyroiditis  TSH 3.94-3.0-2.44-3.16  Iodine 47.2  - T3, Free; Future  - T4, Free; Future  - TSH without Reflex;  Future       Reviewed and/or ordered clinical lab results Yes  Reviewed and/or ordered radiology tests Yes  Reviewed and/or ordered other diagnostic tests No  Discussed test results with performing physician No  Independently reviewed image, tracing, or specimen No  Made a decision to obtain old records No  Reviewed and summarized old records Yes   HbA1C 10.6    LDL 93  0.81  Obtained history from other than patient No    Red Kuhn was counseled regarding symptoms of diabetes, thyroid disease diagnosis, course and complications of disease if inadequately treated, side effects of medications, diagnosis, treatment options, and prognosis, risks, benefits, complications, and alternatives of treatment, labs, imaging and other studies and treatment targets and goals, medications available for treatment, side effects, benefits, complication prevention, basal and prandial glucose production and insulin requirements,. She understands instructions and counseling. CGMS Download Review and Recommendations  See scanned document for BG tracing documentation  [employer] personal CGMS data downloaded and reviewed. This was separate service provided- CGM interpretation    Average glucose 140 ± 21. 0SD  Time in range: 91 %  Time above 180: 9 %  Time under 70: 0%     Basal pattern review: Basal normoglycemia  Postprandial pattern review: postprandial herglycemia   Hypoglycemia review: hypoglycemia after hyperglycemia  Activity related review: Not recorded      Based on the data, I recommend:    Continue Trulicity    2. Portions control, include protein    3. Make healthy food choices      These diagnosis were discussed and reviewed with the patient including the advantages of drug therapy. She was counseled at this visit on the following: diabetes complication prevention and foot care. Return in about 4 months (around 7/16/2023) for diabetes.     Electronically signed by Erin Gonzalez MD on 3/16/2023 at 7:57 AM

## 2023-06-25 DIAGNOSIS — E11.40 TYPE 2 DIABETES, CONTROLLED, WITH NEUROPATHY (HCC): ICD-10-CM

## 2023-06-26 RX ORDER — GLIPIZIDE 5 MG/1
TABLET, FILM COATED, EXTENDED RELEASE ORAL
Qty: 180 TABLET | Refills: 0 | Status: SHIPPED | OUTPATIENT
Start: 2023-06-26 | End: 2023-08-24 | Stop reason: SDUPTHER

## 2023-07-25 DIAGNOSIS — E11.40 TYPE 2 DIABETES, CONTROLLED, WITH NEUROPATHY (HCC): ICD-10-CM

## 2023-07-25 DIAGNOSIS — E06.3 HASHIMOTO'S THYROIDITIS: ICD-10-CM

## 2023-07-25 DIAGNOSIS — E78.2 MIXED HYPERLIPIDEMIA: ICD-10-CM

## 2023-07-25 LAB
ALBUMIN SERPL-MCNC: 4.4 G/DL (ref 3.4–5)
ALBUMIN/GLOB SERPL: 1.7 {RATIO} (ref 1.1–2.2)
ALP SERPL-CCNC: 106 U/L (ref 40–129)
ALT SERPL-CCNC: 16 U/L (ref 10–40)
ANION GAP SERPL CALCULATED.3IONS-SCNC: 13 MMOL/L (ref 3–16)
AST SERPL-CCNC: 11 U/L (ref 15–37)
BILIRUB SERPL-MCNC: 0.3 MG/DL (ref 0–1)
BUN SERPL-MCNC: 16 MG/DL (ref 7–20)
CALCIUM SERPL-MCNC: 9.4 MG/DL (ref 8.3–10.6)
CHLORIDE SERPL-SCNC: 104 MMOL/L (ref 99–110)
CHOLEST SERPL-MCNC: 131 MG/DL (ref 0–199)
CO2 SERPL-SCNC: 21 MMOL/L (ref 21–32)
CREAT SERPL-MCNC: 1 MG/DL (ref 0.6–1.1)
EST. AVERAGE GLUCOSE BLD GHB EST-MCNC: 154.2 MG/DL
GFR SERPLBLD CREATININE-BSD FMLA CKD-EPI: >60 ML/MIN/{1.73_M2}
GLUCOSE SERPL-MCNC: 163 MG/DL (ref 70–99)
HBA1C MFR BLD: 7 %
HDLC SERPL-MCNC: 41 MG/DL (ref 40–60)
LDL CHOLESTEROL CALCULATED: 58 MG/DL
POTASSIUM SERPL-SCNC: 4.5 MMOL/L (ref 3.5–5.1)
PROT SERPL-MCNC: 7 G/DL (ref 6.4–8.2)
SODIUM SERPL-SCNC: 138 MMOL/L (ref 136–145)
T3FREE SERPL-MCNC: 3 PG/ML (ref 2.3–4.2)
T4 FREE SERPL-MCNC: 1.2 NG/DL (ref 0.9–1.8)
TRIGL SERPL-MCNC: 162 MG/DL (ref 0–150)
TSH SERPL DL<=0.005 MIU/L-ACNC: 3.02 UIU/ML (ref 0.27–4.2)
VLDLC SERPL CALC-MCNC: 32 MG/DL

## 2023-08-17 DIAGNOSIS — E11.65 POORLY CONTROLLED TYPE 2 DIABETES MELLITUS WITH NEUROPATHY (HCC): ICD-10-CM

## 2023-08-17 DIAGNOSIS — E11.40 POORLY CONTROLLED TYPE 2 DIABETES MELLITUS WITH NEUROPATHY (HCC): ICD-10-CM

## 2023-08-17 DIAGNOSIS — E11.40 TYPE 2 DIABETES, CONTROLLED, WITH NEUROPATHY (HCC): ICD-10-CM

## 2023-08-17 RX ORDER — DULAGLUTIDE 0.75 MG/.5ML
INJECTION, SOLUTION SUBCUTANEOUS
Qty: 6 ML | Refills: 0 | OUTPATIENT
Start: 2023-08-17

## 2023-08-17 RX ORDER — GLIPIZIDE 5 MG/1
10 TABLET, FILM COATED, EXTENDED RELEASE ORAL 2 TIMES DAILY
Qty: 180 TABLET | Refills: 0 | OUTPATIENT
Start: 2023-08-17

## 2023-08-20 DIAGNOSIS — E11.40 TYPE 2 DIABETES, CONTROLLED, WITH NEUROPATHY (HCC): ICD-10-CM

## 2023-08-20 DIAGNOSIS — E11.65 POORLY CONTROLLED TYPE 2 DIABETES MELLITUS WITH NEUROPATHY (HCC): ICD-10-CM

## 2023-08-20 DIAGNOSIS — E11.40 POORLY CONTROLLED TYPE 2 DIABETES MELLITUS WITH NEUROPATHY (HCC): ICD-10-CM

## 2023-08-21 RX ORDER — GLIPIZIDE 5 MG/1
10 TABLET, FILM COATED, EXTENDED RELEASE ORAL 2 TIMES DAILY
Qty: 180 TABLET | Refills: 0 | OUTPATIENT
Start: 2023-08-21

## 2023-08-21 RX ORDER — DULAGLUTIDE 0.75 MG/.5ML
INJECTION, SOLUTION SUBCUTANEOUS
Qty: 6 ML | Refills: 0 | OUTPATIENT
Start: 2023-08-21

## 2023-08-21 RX ORDER — BLOOD-GLUCOSE SENSOR
EACH MISCELLANEOUS
Qty: 6 EACH | Refills: 1 | OUTPATIENT
Start: 2023-08-21

## 2023-08-21 RX ORDER — METFORMIN HYDROCHLORIDE 500 MG/1
TABLET, EXTENDED RELEASE ORAL
Qty: 90 TABLET | Refills: 1 | OUTPATIENT
Start: 2023-08-21

## 2023-08-24 ENCOUNTER — OFFICE VISIT (OUTPATIENT)
Dept: ENDOCRINOLOGY | Age: 58
End: 2023-08-24

## 2023-08-24 VITALS
BODY MASS INDEX: 39.15 KG/M2 | OXYGEN SATURATION: 97 % | WEIGHT: 235 LBS | HEIGHT: 65 IN | HEART RATE: 76 BPM | TEMPERATURE: 98 F | DIASTOLIC BLOOD PRESSURE: 79 MMHG | RESPIRATION RATE: 14 BRPM | SYSTOLIC BLOOD PRESSURE: 148 MMHG

## 2023-08-24 DIAGNOSIS — E06.3 HASHIMOTO'S THYROIDITIS: ICD-10-CM

## 2023-08-24 DIAGNOSIS — E11.40 TYPE 2 DIABETES, CONTROLLED, WITH NEUROPATHY (HCC): Primary | ICD-10-CM

## 2023-08-24 DIAGNOSIS — E66.01 CLASS 2 SEVERE OBESITY WITH SERIOUS COMORBIDITY AND BODY MASS INDEX (BMI) OF 39.0 TO 39.9 IN ADULT, UNSPECIFIED OBESITY TYPE (HCC): ICD-10-CM

## 2023-08-24 DIAGNOSIS — E78.2 MIXED HYPERLIPIDEMIA: ICD-10-CM

## 2023-08-24 PROBLEM — E66.812 CLASS 2 SEVERE OBESITY WITH SERIOUS COMORBIDITY AND BODY MASS INDEX (BMI) OF 39.0 TO 39.9 IN ADULT: Status: ACTIVE | Noted: 2023-08-24

## 2023-08-24 RX ORDER — ATORVASTATIN CALCIUM 20 MG/1
20 TABLET, FILM COATED ORAL DAILY
Qty: 90 TABLET | Refills: 1 | Status: SHIPPED | OUTPATIENT
Start: 2023-08-24

## 2023-08-24 RX ORDER — GLIPIZIDE 5 MG/1
10 TABLET, FILM COATED, EXTENDED RELEASE ORAL 2 TIMES DAILY
Qty: 360 TABLET | Refills: 1 | Status: SHIPPED | OUTPATIENT
Start: 2023-08-24

## 2023-08-24 RX ORDER — DULAGLUTIDE 0.75 MG/.5ML
INJECTION, SOLUTION SUBCUTANEOUS
Qty: 6 ML | Refills: 1 | Status: SHIPPED | OUTPATIENT
Start: 2023-08-24

## 2023-08-24 RX ORDER — BLOOD-GLUCOSE SENSOR
EACH MISCELLANEOUS
Qty: 6 EACH | Refills: 1 | Status: SHIPPED | OUTPATIENT
Start: 2023-08-24

## 2023-08-24 RX ORDER — METFORMIN HYDROCHLORIDE 500 MG/1
TABLET, EXTENDED RELEASE ORAL
Qty: 90 TABLET | Refills: 1 | Status: SHIPPED | OUTPATIENT
Start: 2023-08-24

## 2023-08-24 NOTE — PROGRESS NOTES
Gennaro Roach is a 56 y.o. female who is being evaluated for Type 2 diabetes mellitus. Current symptoms/problems include  hyperglycemia  and are unchanged. Type 2 diabetes, controlled, with neuropathy ([address]) [E11.40]    Diagnosed with Type 2 diabetes mellitus in 2018. Comorbid conditions:  hyperlipidemia, obesity and Neuropathy     Current diabetic medications include: glipizide ER 5 mg 2 tabs bid, Metformin  mg 1 tab in AM, Trulicity 2.030 mg weekly     Intolerance to diabetes medications: Yes Metformin higher dose caused diarrhea  Trulicity 1.5 mg too much upset stomach, could not tolerate  Ozempic, Januvia too expensive. Weight trend: stable  Prior visit with dietician: yes  Current diet: on average, 3 meals per day  Current exercise: no     Current monitoring regimen: home blood tests - 2 times daily  Has brought blood glucose log/meter:  Yes  Home blood sugar records: fasting range: 100-120 and postprandial range: 100-140  Any episodes of hypoglycemia? No  Hypoglycemia frequency and time(s):    Does patient have Glucagon emergency kit? No  Does patient have rapid acting carbohydrate? No  Does patient wear a medic alert bracelet or necklace? No     2. Mixed hyperlipidemia  Has muscle pain     3. Obesity  Trying to eat healthier  No exercise. No difficulty swallowing, voice change  No history of radiation to her neck  No family history of thyroid cancer     4. Hashimoto's thyroiditis  Has fatigue, dry skin, thinner hair      EXAM: US THYROID        INDICATION:  Goiter       COMPARISON:  None       TECHNIQUE:  Multiplanar grayscale analysis of the thyroid was performed. FINDINGS:       Isthmus: 3 mm in anteroposterior dimension. Right Lobe: 4.5 x 2.3 x 1.4 cm. Left Lobe: 4.1 x 2.0 x 1.6 cm. Other: The thyroid gland is diffusely heterogeneous in echogenicity, but no discrete cyst or nodule identified.                Impression       Heterogeneous thyroid gland,

## 2023-09-28 RX ORDER — DULAGLUTIDE 0.75 MG/.5ML
INJECTION, SOLUTION SUBCUTANEOUS
Qty: 6 ML | Refills: 1 | Status: SHIPPED | OUTPATIENT
Start: 2023-09-28

## 2023-11-15 ENCOUNTER — HOSPITAL ENCOUNTER (OUTPATIENT)
Dept: WOMENS IMAGING | Age: 58
Discharge: HOME | End: 2023-11-15
Payer: COMMERCIAL

## 2023-11-15 VITALS — HEIGHT: 65 IN | BODY MASS INDEX: 39.99 KG/M2 | WEIGHT: 240 LBS

## 2023-11-15 DIAGNOSIS — Z12.31 VISIT FOR SCREENING MAMMOGRAM: ICD-10-CM

## 2023-11-15 PROCEDURE — 77063 BREAST TOMOSYNTHESIS BI: CPT

## 2024-01-10 DIAGNOSIS — E11.40 TYPE 2 DIABETES, CONTROLLED, WITH NEUROPATHY (HCC): ICD-10-CM

## 2024-01-10 RX ORDER — METFORMIN HYDROCHLORIDE 500 MG/1
TABLET, EXTENDED RELEASE ORAL
Qty: 90 TABLET | Refills: 1 | Status: SHIPPED | OUTPATIENT
Start: 2024-01-10

## 2024-01-23 ENCOUNTER — TELEPHONE (OUTPATIENT)
Dept: ENDOCRINOLOGY | Age: 59
End: 2024-01-23

## 2024-02-19 DIAGNOSIS — E11.40 TYPE 2 DIABETES, CONTROLLED, WITH NEUROPATHY (HCC): ICD-10-CM

## 2024-02-19 DIAGNOSIS — E78.2 MIXED HYPERLIPIDEMIA: ICD-10-CM

## 2024-02-19 DIAGNOSIS — E06.3 HASHIMOTO'S THYROIDITIS: ICD-10-CM

## 2024-02-19 LAB
ALBUMIN SERPL-MCNC: 4.6 G/DL (ref 3.4–5)
ALBUMIN/GLOB SERPL: 1.6 {RATIO} (ref 1.1–2.2)
ALP SERPL-CCNC: 92 U/L (ref 40–129)
ALT SERPL-CCNC: 14 U/L (ref 10–40)
ANION GAP SERPL CALCULATED.3IONS-SCNC: 12 MMOL/L (ref 3–16)
AST SERPL-CCNC: 11 U/L (ref 15–37)
BILIRUB SERPL-MCNC: 0.3 MG/DL (ref 0–1)
BUN SERPL-MCNC: 18 MG/DL (ref 7–20)
CALCIUM SERPL-MCNC: 9.4 MG/DL (ref 8.3–10.6)
CHLORIDE SERPL-SCNC: 103 MMOL/L (ref 99–110)
CHOLEST SERPL-MCNC: 151 MG/DL (ref 0–199)
CO2 SERPL-SCNC: 23 MMOL/L (ref 21–32)
CREAT SERPL-MCNC: 1 MG/DL (ref 0.6–1.1)
CREAT UR-MCNC: 109.7 MG/DL (ref 28–259)
GFR SERPLBLD CREATININE-BSD FMLA CKD-EPI: >60 ML/MIN/{1.73_M2}
GLUCOSE SERPL-MCNC: 116 MG/DL (ref 70–99)
HDLC SERPL-MCNC: 43 MG/DL (ref 40–60)
LDL CHOLESTEROL CALCULATED: 79 MG/DL
MICROALBUMIN UR DL<=1MG/L-MCNC: 6.1 MG/DL
MICROALBUMIN/CREAT UR: 55.6 MG/G (ref 0–30)
POTASSIUM SERPL-SCNC: 4.5 MMOL/L (ref 3.5–5.1)
PROT SERPL-MCNC: 7.4 G/DL (ref 6.4–8.2)
SODIUM SERPL-SCNC: 138 MMOL/L (ref 136–145)
T4 FREE SERPL-MCNC: 1.1 NG/DL (ref 0.9–1.8)
TRIGL SERPL-MCNC: 144 MG/DL (ref 0–150)
TSH SERPL DL<=0.005 MIU/L-ACNC: 2.46 UIU/ML (ref 0.27–4.2)
VLDLC SERPL CALC-MCNC: 29 MG/DL

## 2024-02-20 LAB
EST. AVERAGE GLUCOSE BLD GHB EST-MCNC: 148.5 MG/DL
HBA1C MFR BLD: 6.8 %

## 2024-02-23 ENCOUNTER — OFFICE VISIT (OUTPATIENT)
Dept: ENDOCRINOLOGY | Age: 59
End: 2024-02-23

## 2024-02-23 VITALS
HEIGHT: 65 IN | SYSTOLIC BLOOD PRESSURE: 129 MMHG | RESPIRATION RATE: 14 BRPM | WEIGHT: 233 LBS | HEART RATE: 80 BPM | TEMPERATURE: 98 F | DIASTOLIC BLOOD PRESSURE: 69 MMHG | BODY MASS INDEX: 38.82 KG/M2

## 2024-02-23 DIAGNOSIS — E66.01 CLASS 2 SEVERE OBESITY WITH SERIOUS COMORBIDITY AND BODY MASS INDEX (BMI) OF 38.0 TO 38.9 IN ADULT, UNSPECIFIED OBESITY TYPE (HCC): ICD-10-CM

## 2024-02-23 DIAGNOSIS — R80.9 MICROALBUMINURIA: ICD-10-CM

## 2024-02-23 DIAGNOSIS — E06.3 HASHIMOTO'S THYROIDITIS: ICD-10-CM

## 2024-02-23 DIAGNOSIS — E78.2 MIXED HYPERLIPIDEMIA: ICD-10-CM

## 2024-02-23 DIAGNOSIS — E11.40 TYPE 2 DIABETES, CONTROLLED, WITH NEUROPATHY (HCC): Primary | ICD-10-CM

## 2024-02-23 RX ORDER — METFORMIN HYDROCHLORIDE 500 MG/1
TABLET, EXTENDED RELEASE ORAL
Qty: 90 TABLET | Refills: 1 | Status: SHIPPED | OUTPATIENT
Start: 2024-02-23

## 2024-02-23 RX ORDER — GLIPIZIDE 5 MG/1
10 TABLET, FILM COATED, EXTENDED RELEASE ORAL 2 TIMES DAILY
Qty: 360 TABLET | Refills: 1 | Status: SHIPPED | OUTPATIENT
Start: 2024-02-23

## 2024-02-23 RX ORDER — ATORVASTATIN CALCIUM 20 MG/1
20 TABLET, FILM COATED ORAL DAILY
Qty: 90 TABLET | Refills: 1 | Status: SHIPPED | OUTPATIENT
Start: 2024-02-23

## 2024-02-23 RX ORDER — DULAGLUTIDE 0.75 MG/.5ML
INJECTION, SOLUTION SUBCUTANEOUS
Qty: 6 ML | Refills: 1 | Status: SHIPPED | OUTPATIENT
Start: 2024-02-23

## 2024-02-23 NOTE — PROGRESS NOTES
Show Additional Area 1: Yes 02/19/2024 07:45 AM    LABGLOM >60 02/19/2024 07:45 AM    GFRAA >60 05/19/2022 07:47 AM    AGRATIO 1.6 02/19/2024 07:45 AM     Lab Results   Component Value Date/Time    TSH 2.46 02/19/2024 07:45 AM    FT3 3.0 07/25/2023 07:37 AM     Lab Results   Component Value Date/Time    LABA1C 6.8 02/19/2024 07:45 AM     Lab Results   Component Value Date/Time    .5 02/19/2024 07:45 AM     Lab Results   Component Value Date/Time    CHOL 152 03/14/2023 07:50 AM     Lab Results   Component Value Date/Time    TRIG 140 03/14/2023 07:50 AM     Lab Results   Component Value Date/Time    HDL 43 02/19/2024 07:45 AM     Lab Results   Component Value Date/Time    LDLCALC 79 02/19/2024 07:45 AM     No results found for: \"VLDL\"    No results found for: \"CHOLHDLRATIO\"  No results found for: \"FBYQ36ECJ\"    No results found for: \"VITD25\"     Review of Systems:  Constitutional: no fatigue, no fever, no recent weight gain, no recent weight loss, no changes in appetite  Eyes: no eye pain, has change in vision, no eye redness, no eye irritation, no double vision  Ears, nose, throat: no nasal congestion, no sore throat, no earache, no decrease in hearing, no hoarseness, no dry mouth, no sinus problems, no difficulty swallowing, no neck lumps, no dental problems, no mouth sores, no ringing in ears  Pulmonary: no shortness of breath, no wheezing, no dyspnea on exertion, no cough  Cardiovascular: no chest pain, no lower extremity edema, no orthopnea, no intermittent leg claudication, no palpitations  Gastrointestinal: no abdominal pain, no nausea, no vomiting, no diarrhea, no constipation, no dysphagia, no heartburn, no bloating  Genitourinary: no dysuria, no urinary incontinence, no urinary hesitancy, no urinary frequency, no feelings of urinary urgency, has nocturia  Musculoskeletal: no joint swelling, no joint stiffness, no joint pain, no muscle cramps, no muscle pain  Integument/Breast: no hair loss, no skin rashes, no skin lesions, no  Right Periorbital Units: 0 Consent: Written consent obtained. Risks include but not limited to lid/brow ptosis, bruising, swelling, diplopia, temporary effect, incomplete chemical denervation. Lot #: [id number] Expiration Date (Month Year): 08/2022 Dilution (U/0.1 Cc): 4 Periorbital Skin Units: 24 Detail Level: Detailed Post-Care Instructions: Patient instructed to not lie down for 4 hours and limit physical activity for 24 hours. Patient instructed not to travel by airplane for 48 hours. Price (Use Numbers Only, No Special Characters Or $): 13 Glabellar Complex Units: 20

## 2024-03-13 ENCOUNTER — TELEPHONE (OUTPATIENT)
Dept: ENDOCRINOLOGY | Age: 59
End: 2024-03-13

## 2024-03-13 DIAGNOSIS — E11.40 TYPE 2 DIABETES, CONTROLLED, WITH NEUROPATHY (HCC): ICD-10-CM

## 2024-03-13 RX ORDER — DULAGLUTIDE 0.75 MG/.5ML
INJECTION, SOLUTION SUBCUTANEOUS
Qty: 6 ML | Refills: 1 | OUTPATIENT
Start: 2024-03-13

## 2024-03-14 NOTE — TELEPHONE ENCOUNTER
Submitted PA for Trulicity  Via [facility] Key: [id number] STATUS: PENDING.    Follow up done daily; if no decision with in three days we will refax.  If another three days goes by with no decision will call the insurance for status.

## 2024-03-21 NOTE — TELEPHONE ENCOUNTER
Called insurance to check status. Spoke with Clau. She stated the PA is still in clinical review. She stated they received additional information on 3/19 and so it then went back in to clinical review where it is now still pending.     Will call again to check status if a decision is not sent.    fall

## 2024-03-26 NOTE — TELEPHONE ENCOUNTER
Called insurance and spoke with Clau MACHADO. She stated PA has been approved. Approval dates 3/21/24- indefinitely which means it is approved for the lifetime of the plan. She stated they do not generate auth numbers.     If this requires a response please respond to the pool ( P MHCX P[facility] MEDICATION PRE-AUTH).      Thank you please advise patient.

## 2024-04-22 ENCOUNTER — TELEPHONE (OUTPATIENT)
Dept: ENDOCRINOLOGY | Age: 59
End: 2024-04-22

## 2024-04-22 NOTE — TELEPHONE ENCOUNTER
Submitted PA for Ozempic  Via [facility] Key: [id number] STATUS: PENDING.    Follow up done daily; if no decision with in three days we will refax.  If another three days goes by with no decision will call the insurance for status.

## 2024-04-26 NOTE — TELEPHONE ENCOUNTER
Called insurance and spoke with Petra. PA is still pending. Will update when a decision is received. Thanks!

## 2024-08-29 DIAGNOSIS — E11.40 TYPE 2 DIABETES, CONTROLLED, WITH NEUROPATHY (HCC): ICD-10-CM

## 2024-08-29 DIAGNOSIS — E78.2 MIXED HYPERLIPIDEMIA: ICD-10-CM

## 2024-08-29 DIAGNOSIS — R80.9 MICROALBUMINURIA: ICD-10-CM

## 2024-08-29 LAB
ALBUMIN SERPL-MCNC: 4.5 G/DL (ref 3.4–5)
ALBUMIN/GLOB SERPL: 1.8 {RATIO} (ref 1.1–2.2)
ALP SERPL-CCNC: 85 U/L (ref 40–129)
ALT SERPL-CCNC: 23 U/L (ref 10–40)
ANION GAP SERPL CALCULATED.3IONS-SCNC: 13 MMOL/L (ref 3–16)
AST SERPL-CCNC: 17 U/L (ref 15–37)
BILIRUB SERPL-MCNC: <0.2 MG/DL (ref 0–1)
BUN SERPL-MCNC: 19 MG/DL (ref 7–20)
CALCIUM SERPL-MCNC: 9.7 MG/DL (ref 8.3–10.6)
CHLORIDE SERPL-SCNC: 104 MMOL/L (ref 99–110)
CHOLEST SERPL-MCNC: 158 MG/DL (ref 0–199)
CO2 SERPL-SCNC: 21 MMOL/L (ref 21–32)
CREAT SERPL-MCNC: 0.9 MG/DL (ref 0.6–1.1)
CREAT UR-MCNC: 190 MG/DL (ref 28–259)
EST. AVERAGE GLUCOSE BLD GHB EST-MCNC: 134.1 MG/DL
GFR SERPLBLD CREATININE-BSD FMLA CKD-EPI: 75 ML/MIN/{1.73_M2}
GLUCOSE SERPL-MCNC: 124 MG/DL (ref 70–99)
HBA1C MFR BLD: 6.3 %
HDLC SERPL-MCNC: 38 MG/DL (ref 40–60)
LDL CHOLESTEROL: 84 MG/DL
MICROALBUMIN UR DL<=1MG/L-MCNC: 32.2 MG/DL
MICROALBUMIN/CREAT UR: 169.5 MG/G (ref 0–30)
POTASSIUM SERPL-SCNC: 4.6 MMOL/L (ref 3.5–5.1)
PROT SERPL-MCNC: 7 G/DL (ref 6.4–8.2)
SODIUM SERPL-SCNC: 138 MMOL/L (ref 136–145)
TRIGL SERPL-MCNC: 181 MG/DL (ref 0–150)
VLDLC SERPL CALC-MCNC: 36 MG/DL

## 2024-08-30 ENCOUNTER — OFFICE VISIT (OUTPATIENT)
Dept: ENDOCRINOLOGY | Age: 59
End: 2024-08-30
Payer: COMMERCIAL

## 2024-08-30 VITALS
HEIGHT: 65 IN | BODY MASS INDEX: 35.65 KG/M2 | DIASTOLIC BLOOD PRESSURE: 77 MMHG | SYSTOLIC BLOOD PRESSURE: 130 MMHG | WEIGHT: 214 LBS | TEMPERATURE: 98 F | HEART RATE: 79 BPM | RESPIRATION RATE: 14 BRPM

## 2024-08-30 DIAGNOSIS — E06.3 HASHIMOTO'S THYROIDITIS: ICD-10-CM

## 2024-08-30 DIAGNOSIS — E66.01 CLASS 2 SEVERE OBESITY WITH SERIOUS COMORBIDITY AND BODY MASS INDEX (BMI) OF 38.0 TO 38.9 IN ADULT, UNSPECIFIED OBESITY TYPE (HCC): ICD-10-CM

## 2024-08-30 DIAGNOSIS — R80.9 MICROALBUMINURIA: ICD-10-CM

## 2024-08-30 DIAGNOSIS — E11.40 TYPE 2 DIABETES, CONTROLLED, WITH NEUROPATHY (HCC): Primary | ICD-10-CM

## 2024-08-30 DIAGNOSIS — E78.2 MIXED HYPERLIPIDEMIA: ICD-10-CM

## 2024-08-30 PROCEDURE — 99214 OFFICE O/P EST MOD 30 MIN: CPT | Performed by: INTERNAL MEDICINE

## 2024-08-30 PROCEDURE — 3044F HG A1C LEVEL LT 7.0%: CPT | Performed by: INTERNAL MEDICINE

## 2024-08-30 RX ORDER — GLIPIZIDE 5 MG/1
10 TABLET, FILM COATED, EXTENDED RELEASE ORAL 2 TIMES DAILY
Qty: 360 TABLET | Refills: 1 | Status: SHIPPED | OUTPATIENT
Start: 2024-08-30

## 2024-08-30 NOTE — PROGRESS NOTES
Barbara Malik is a 58 y.o. female who is being evaluated for Type 2 diabetes mellitus.     Current symptoms/problems include  hyperglycemia  and are unchanged.     Type 2 diabetes, controlled, with neuropathy (HCC) [E11.40]    Diagnosed with Type 2 diabetes mellitus in 2018.     Comorbid conditions:  hyperlipidemia, obesity and Neuropathy     Current diabetic medications include: glipizide ER 5 mg 2 tabs bid, Metformin  mg 1 tab in AM, Ozempic 0.5 mg weekly     Intolerance to diabetes medications: Yes Metformin higher dose caused diarrhea  Trulicity 1.5 mg too much upset stomach, could not tolerate  Ozempic, Januvia too expensive.   No history of UTIs.      Weight trend: stable  Prior visit with dietician: yes  Current diet: on average, 3 meals per day  Current exercise: no     Current monitoring regimen: home blood tests - 2 times daily  Has brought blood glucose log/meter:  Yes  Home blood sugar records: fasting range: 100-120 and postprandial range: 100-140  Any episodes of hypoglycemia? No  Hypoglycemia frequency and time(s):    Does patient have Glucagon emergency kit? No  Does patient have rapid acting carbohydrate?  No  Does patient wear a medic alert bracelet or necklace?  No     2. Mixed hyperlipidemia  Has muscle pain     3. Obesity  Trying to eat healthier  No exercise.  No difficulty swallowing, voice change  No history of radiation to her neck  No family history of thyroid cancer     4. Hashimoto's thyroiditis  Has fatigue, dry skin, thinner hair    5. Microalbuminuria  No urination problems other than frequency      EXAM: US THYROID        INDICATION:  Goiter       COMPARISON:  None       TECHNIQUE:  Multiplanar grayscale analysis of the thyroid was performed.           FINDINGS:       Isthmus: 3 mm in anteroposterior dimension.       Right Lobe: 4.5 x 2.3 x 1.4 cm.       Left Lobe: 4.1 x 2.0 x 1.6 cm.       Other: The thyroid gland is diffusely heterogeneous in echogenicity, but no discrete  sore throat, no earache, no decrease in hearing, no hoarseness, no dry mouth, no sinus problems, no difficulty swallowing, no neck lumps, no dental problems, no mouth sores, no ringing in ears  Pulmonary: no shortness of breath, no wheezing, no dyspnea on exertion, no cough  Cardiovascular: no chest pain, no lower extremity edema, no orthopnea, no intermittent leg claudication, no palpitations  Gastrointestinal: no abdominal pain, no nausea, no vomiting, no diarrhea, no constipation, no dysphagia, no heartburn, no bloating  Genitourinary: no dysuria, no urinary incontinence, no urinary hesitancy, no urinary frequency, no feelings of urinary urgency, has nocturia  Musculoskeletal: no joint swelling, no joint stiffness, no joint pain, no muscle cramps, no muscle pain  Integument/Breast: no hair loss, no skin rashes, no skin lesions, no itching, has dry skin  Neurological: no numbness, no tingling, no weakness, no confusion, has headaches, no dizziness, no fainting, no tremors, no decrease in memory, no balance problems  Psychiatric: has anxiety, has depression, has insomnia  Hematologic/Lymphatic: no tendency for easy bleeding, no swollen lymph nodes, no tendency for easy bruising  Immunology: no seasonal allergies, no frequent infections, no frequent illnesses  Endocrine: no temperature intolerance    /77   Pulse 79   Temp 98 °F (36.7 °C)   Resp 14   Ht 1.651 m (5' 5\")   Wt 97.1 kg (214 lb)   BMI 35.61 kg/m²    Wt Readings from Last 3 Encounters:   08/30/24 97.1 kg (214 lb)   02/23/24 105.7 kg (233 lb)   11/15/23 108.9 kg (240 lb)     Body mass index is 35.61 kg/m².      OBJECTIVE:  Constitutional: no acute distress, well appearing and well nourished  Psychiatric: oriented to person, place and time, judgement and insight and normal, recent and remote memory and intact and mood and affect are normal  Skin: skin and subcutaneous tissue is normal without mass, normal turgor  Head and Face: examination of

## 2024-09-12 DIAGNOSIS — E11.40 TYPE 2 DIABETES, CONTROLLED, WITH NEUROPATHY (HCC): ICD-10-CM

## 2024-09-12 RX ORDER — BLOOD-GLUCOSE SENSOR
EACH MISCELLANEOUS
Qty: 6 EACH | Refills: 1 | Status: SHIPPED | OUTPATIENT
Start: 2024-09-12

## 2024-09-15 PROBLEM — E27.8 ADRENAL NODULE (HCC): Status: ACTIVE | Noted: 2024-09-15

## 2024-09-15 PROBLEM — E27.9 ADRENAL NODULE (HCC): Status: ACTIVE | Noted: 2024-09-15

## 2024-09-16 ENCOUNTER — OFFICE VISIT (OUTPATIENT)
Dept: ENDOCRINOLOGY | Age: 59
End: 2024-09-16
Payer: COMMERCIAL

## 2024-09-16 VITALS
TEMPERATURE: 98 F | BODY MASS INDEX: 35.82 KG/M2 | HEIGHT: 65 IN | WEIGHT: 215 LBS | DIASTOLIC BLOOD PRESSURE: 78 MMHG | SYSTOLIC BLOOD PRESSURE: 130 MMHG | RESPIRATION RATE: 14 BRPM | HEART RATE: 67 BPM | OXYGEN SATURATION: 96 %

## 2024-09-16 DIAGNOSIS — E11.65 POORLY CONTROLLED TYPE 2 DIABETES MELLITUS WITH NEUROPATHY (HCC): ICD-10-CM

## 2024-09-16 DIAGNOSIS — I10 HYPERTENSION, UNSPECIFIED TYPE: ICD-10-CM

## 2024-09-16 DIAGNOSIS — E27.8 ADRENAL NODULE (HCC): Primary | ICD-10-CM

## 2024-09-16 DIAGNOSIS — E11.40 POORLY CONTROLLED TYPE 2 DIABETES MELLITUS WITH NEUROPATHY (HCC): ICD-10-CM

## 2024-09-16 PROCEDURE — 3075F SYST BP GE 130 - 139MM HG: CPT | Performed by: INTERNAL MEDICINE

## 2024-09-16 PROCEDURE — 3078F DIAST BP <80 MM HG: CPT | Performed by: INTERNAL MEDICINE

## 2024-09-16 PROCEDURE — 99214 OFFICE O/P EST MOD 30 MIN: CPT | Performed by: INTERNAL MEDICINE

## 2024-09-16 RX ORDER — SEMAGLUTIDE 0.68 MG/ML
INJECTION, SOLUTION SUBCUTANEOUS
Qty: 3 ML | Refills: 2 | Status: SHIPPED | OUTPATIENT
Start: 2024-09-16

## 2024-09-16 RX ORDER — DEXAMETHASONE 1 MG
TABLET ORAL
Qty: 1 TABLET | Refills: 0 | Status: SHIPPED | OUTPATIENT
Start: 2024-09-16

## 2024-10-03 DIAGNOSIS — E27.9 ADRENAL NODULE (HCC): ICD-10-CM

## 2024-10-03 LAB
ALBUMIN SERPL-MCNC: 4.3 G/DL (ref 3.4–5)
ANION GAP SERPL CALCULATED.3IONS-SCNC: 14 MMOL/L (ref 3–16)
BUN SERPL-MCNC: 17 MG/DL (ref 7–20)
CALCIUM SERPL-MCNC: 9.8 MG/DL (ref 8.3–10.6)
CHLORIDE SERPL-SCNC: 104 MMOL/L (ref 99–110)
CO2 SERPL-SCNC: 21 MMOL/L (ref 21–32)
COLLECT DURATION TIME UR: 24 HR
CORTIS AM PEAK SERPL-MCNC: 6.4 UG/DL (ref 4.3–22.4)
CREAT 24H UR-MRATE: 1.6 G/24HR (ref 0.6–1.5)
CREAT CL 24H UR+SERPL-VRATE: 107 ML/MIN (ref 88–128)
CREAT SERPL-MCNC: 0.9 MG/DL (ref 0.6–1.1)
CREAT SERPL-MCNC: 0.9 MG/DL (ref 0.6–1.2)
GFR SERPLBLD CREATININE-BSD FMLA CKD-EPI: 75 ML/MIN/{1.73_M2}
GLUCOSE SERPL-MCNC: 93 MG/DL (ref 70–99)
PHOSPHATE SERPL-MCNC: 3.7 MG/DL (ref 2.5–4.9)
POTASSIUM SERPL-SCNC: 4.5 MMOL/L (ref 3.5–5.1)
SODIUM SERPL-SCNC: 139 MMOL/L (ref 136–145)
SPECIMEN VOL 24H UR: 1000 ML

## 2024-10-04 LAB — DHEA-S SERPL-MCNC: 18 UG/DL (ref 18.9–205)

## 2024-10-05 LAB — RENIN PLAS-CCNC: 3.7 NG/ML/HR

## 2024-10-06 LAB
ALDOST SERPL-MCNC: 9.7 NG/DL
CATECHOLS UR-IMP: ABNORMAL
COLLECT DURATION TIME SPEC: 24 H
CORTIS F 24H UR HPLC-MCNC: 35.8 UG/L
CORTIS F 24H UR-MRATE: 35.8 UG/D
CORTIS F/CREAT 24H UR: 21.06 UG/G CRT
CREAT 24H UR-MCNC: 170 MG/DL
CREAT 24H UR-MRATE: 1700 MG/D (ref 500–1400)
DOPAMINE 24H UR-MRATE: 258 UG/D (ref 71–485)
DOPAMINE UR-MCNC: 258 UG/L
DOPAMINE/CREAT UR: 152 UG/G CRT (ref 0–250)
EPINEPH 24H UR-MRATE: 2 UG/D (ref 1–14)
EPINEPH UR-MCNC: 2 UG/L
EPINEPH/CREAT UR: 1 UG/G CRT (ref 0–20)
IMP & REVIEW OF LAB RESULTS: NORMAL
METANEPH 24H UR-MCNC: 92 UG/L
METANEPH 24H UR-MRATE: 92 UG/D (ref 36–229)
METANEPH+NORMETANEPH UR-IMP: NORMAL
METANEPH/CREAT 24H UR: 54 UG/G CRT (ref 0–300)
NOREPINEPH 24H UR-MRATE: 45 UG/D (ref 14–120)
NOREPINEPH UR-MCNC: 45 UG/L
NOREPINEPH/CREAT UR: 26 UG/G CRT (ref 0–45)
NORMETANEPHRINE 24H UR-MCNC: 436 UG/L
NORMETANEPHRINE 24H UR-MRATE: 436 UG/D (ref 95–650)
NORMETANEPHRINE/CREAT 24H UR: 256 UG/G CRT (ref 0–400)
SPECIMEN VOL ?TM UR: 1000 ML

## 2024-10-07 LAB
ANNOTATION COMMENT IMP: NORMAL
METANEPHS SERPL-SCNC: 0.12 NMOL/L (ref 0–0.49)
NORMETANEPHRINE SERPL-SCNC: 0.47 NMOL/L (ref 0–0.89)

## 2024-10-08 LAB — ACTH PLAS-MCNC: 12 PG/ML (ref 7–63)

## 2024-10-21 DIAGNOSIS — E11.40 TYPE 2 DIABETES, CONTROLLED, WITH NEUROPATHY (HCC): ICD-10-CM

## 2024-10-22 RX ORDER — METFORMIN HYDROCHLORIDE 500 MG/1
TABLET, EXTENDED RELEASE ORAL
Qty: 90 TABLET | Refills: 0 | Status: SHIPPED | OUTPATIENT
Start: 2024-10-22

## 2024-11-15 DIAGNOSIS — E06.3 HASHIMOTO'S THYROIDITIS: ICD-10-CM

## 2024-11-15 DIAGNOSIS — R80.9 MICROALBUMINURIA: ICD-10-CM

## 2024-11-15 DIAGNOSIS — E27.9 ADRENAL NODULE (HCC): ICD-10-CM

## 2024-11-15 DIAGNOSIS — E11.40 TYPE 2 DIABETES, CONTROLLED, WITH NEUROPATHY (HCC): ICD-10-CM

## 2024-11-15 DIAGNOSIS — E78.2 MIXED HYPERLIPIDEMIA: ICD-10-CM

## 2024-11-15 LAB
ALBUMIN SERPL-MCNC: 4.5 G/DL (ref 3.4–5)
ALBUMIN/GLOB SERPL: 1.8 {RATIO} (ref 1.1–2.2)
ALP SERPL-CCNC: 96 U/L (ref 40–129)
ALT SERPL-CCNC: 18 U/L (ref 10–40)
ANION GAP SERPL CALCULATED.3IONS-SCNC: 11 MMOL/L (ref 3–16)
AST SERPL-CCNC: 15 U/L (ref 15–37)
BILIRUB SERPL-MCNC: 0.3 MG/DL (ref 0–1)
BUN SERPL-MCNC: 15 MG/DL (ref 7–20)
CALCIUM SERPL-MCNC: 9.9 MG/DL (ref 8.3–10.6)
CHLORIDE SERPL-SCNC: 105 MMOL/L (ref 99–110)
CHOLEST SERPL-MCNC: 147 MG/DL (ref 0–199)
CO2 SERPL-SCNC: 22 MMOL/L (ref 21–32)
CORTIS AM PEAK SERPL-MCNC: 1.3 UG/DL (ref 4.3–22.4)
CREAT SERPL-MCNC: 0.9 MG/DL (ref 0.6–1.1)
CREAT UR-MCNC: 34.8 MG/DL (ref 28–259)
EST. AVERAGE GLUCOSE BLD GHB EST-MCNC: 137 MG/DL
GFR SERPLBLD CREATININE-BSD FMLA CKD-EPI: 75 ML/MIN/{1.73_M2}
GLUCOSE SERPL-MCNC: 105 MG/DL (ref 70–99)
HBA1C MFR BLD: 6.4 %
HDLC SERPL-MCNC: 48 MG/DL (ref 40–60)
LDLC SERPL CALC-MCNC: 85 MG/DL
MICROALBUMIN UR DL<=1MG/L-MCNC: <1.2 MG/DL
MICROALBUMIN/CREAT UR: NORMAL MG/G (ref 0–30)
POTASSIUM SERPL-SCNC: 4.7 MMOL/L (ref 3.5–5.1)
PROT SERPL-MCNC: 7 G/DL (ref 6.4–8.2)
SODIUM SERPL-SCNC: 138 MMOL/L (ref 136–145)
T4 FREE SERPL-MCNC: 1 NG/DL (ref 0.9–1.8)
TRIGL SERPL-MCNC: 70 MG/DL (ref 0–150)
TSH SERPL DL<=0.005 MIU/L-ACNC: 1.51 UIU/ML (ref 0.27–4.2)
VLDLC SERPL CALC-MCNC: 14 MG/DL

## 2024-11-18 ENCOUNTER — OFFICE VISIT (OUTPATIENT)
Dept: ENDOCRINOLOGY | Age: 59
End: 2024-11-18
Payer: COMMERCIAL

## 2024-11-18 VITALS
HEART RATE: 79 BPM | HEIGHT: 65 IN | OXYGEN SATURATION: 98 % | SYSTOLIC BLOOD PRESSURE: 131 MMHG | BODY MASS INDEX: 34.99 KG/M2 | TEMPERATURE: 98 F | WEIGHT: 210 LBS | RESPIRATION RATE: 14 BRPM | DIASTOLIC BLOOD PRESSURE: 79 MMHG

## 2024-11-18 DIAGNOSIS — E78.2 MIXED HYPERLIPIDEMIA: ICD-10-CM

## 2024-11-18 DIAGNOSIS — E27.9 ADRENAL NODULE (HCC): ICD-10-CM

## 2024-11-18 DIAGNOSIS — E11.40 TYPE 2 DIABETES, CONTROLLED, WITH NEUROPATHY (HCC): Primary | ICD-10-CM

## 2024-11-18 DIAGNOSIS — E66.811 CLASS 1 OBESITY WITH SERIOUS COMORBIDITY AND BODY MASS INDEX (BMI) OF 34.0 TO 34.9 IN ADULT, UNSPECIFIED OBESITY TYPE: ICD-10-CM

## 2024-11-18 DIAGNOSIS — R80.9 MICROALBUMINURIA: ICD-10-CM

## 2024-11-18 DIAGNOSIS — E06.3 HASHIMOTO'S THYROIDITIS: ICD-10-CM

## 2024-11-18 DIAGNOSIS — E11.65 POORLY CONTROLLED TYPE 2 DIABETES MELLITUS WITH NEUROPATHY (HCC): ICD-10-CM

## 2024-11-18 DIAGNOSIS — E11.40 POORLY CONTROLLED TYPE 2 DIABETES MELLITUS WITH NEUROPATHY (HCC): ICD-10-CM

## 2024-11-18 PROCEDURE — 99214 OFFICE O/P EST MOD 30 MIN: CPT | Performed by: INTERNAL MEDICINE

## 2024-11-18 PROCEDURE — 3078F DIAST BP <80 MM HG: CPT | Performed by: INTERNAL MEDICINE

## 2024-11-18 PROCEDURE — 3075F SYST BP GE 130 - 139MM HG: CPT | Performed by: INTERNAL MEDICINE

## 2024-11-18 PROCEDURE — 3044F HG A1C LEVEL LT 7.0%: CPT | Performed by: INTERNAL MEDICINE

## 2024-11-18 RX ORDER — ROSUVASTATIN CALCIUM 40 MG/1
40 TABLET, COATED ORAL DAILY
Qty: 30 TABLET | Refills: 3 | Status: SHIPPED | OUTPATIENT
Start: 2024-11-18

## 2024-11-18 RX ORDER — SEMAGLUTIDE 0.68 MG/ML
INJECTION, SOLUTION SUBCUTANEOUS
Qty: 3 ML | Refills: 3
Start: 2024-11-18

## 2024-11-18 RX ORDER — GLIPIZIDE 5 MG/1
5 TABLET, FILM COATED, EXTENDED RELEASE ORAL 2 TIMES DAILY
Qty: 180 TABLET | Refills: 1
Start: 2024-11-18

## 2024-11-18 NOTE — PROGRESS NOTES
serious comorbidity and body mass index (BMI) of 39.0 to 39.9 in adult    Microalbuminuria    Adrenal nodule (HCC)    Hypertension     Past Surgical History:   Procedure Laterality Date    HYSTERECTOMY (CERVIX STATUS UNKNOWN)      LITHOTRIPSY      x8     Social History     Socioeconomic History    Marital status:      Spouse name: Not on file    Number of children: Not on file    Years of education: Not on file    Highest education level: Not on file   Occupational History    Not on file   Tobacco Use    Smoking status: Never    Smokeless tobacco: Never   Vaping Use    Vaping status: Never Used   Substance and Sexual Activity    Alcohol use: Not Currently    Drug use: Yes     Types: Marijuana (Weed)     Comment: occasional    Sexual activity: Yes     Partners: Male   Other Topics Concern    Not on file   Social History Narrative    ** Merged History Encounter **          Social Determinants of Health     Financial Resource Strain: Not on file   Food Insecurity: No Transportation Needs (8/28/2024)    Received from [id number]  [employer]    Yearly Questionnaire     Do you need any assistance with obtaining housing, meals, medication, transportation or medical equipment?: No     Assistance needed for:: Not on file   Transportation Needs: No Transportation Needs (8/28/2024)    Received from [employer] [employer]    Yearly Questionnaire     Do you need any assistance with obtaining housing, meals, medication, transportation or medical equipment?: No     Assistance needed for:: Not on file   Physical Activity: Not on file   Stress: Not on file   Social Connections: Not on file   Intimate Partner Violence: Not on file   Housing Stability: No Transportation Needs (8/28/2024)    Received from [id number]  [employer]    Yearly Questionnaire     Do you need any assistance with obtaining housing, meals, medication, transportation or medical equipment?: No     Assistance needed for:: Not on file

## 2024-11-20 LAB — DEXAMETHASONE SERPL-MCNC: 376.8 NG/DL

## 2024-12-09 DIAGNOSIS — E11.40 POORLY CONTROLLED TYPE 2 DIABETES MELLITUS WITH NEUROPATHY (HCC): ICD-10-CM

## 2024-12-09 DIAGNOSIS — E11.65 POORLY CONTROLLED TYPE 2 DIABETES MELLITUS WITH NEUROPATHY (HCC): ICD-10-CM

## 2024-12-10 RX ORDER — SEMAGLUTIDE 0.68 MG/ML
INJECTION, SOLUTION SUBCUTANEOUS
Qty: 3 ML | Refills: 2 | Status: SHIPPED | OUTPATIENT
Start: 2024-12-10

## 2025-01-17 DIAGNOSIS — E11.40 TYPE 2 DIABETES, CONTROLLED, WITH NEUROPATHY (HCC): ICD-10-CM

## 2025-01-17 RX ORDER — METFORMIN HYDROCHLORIDE 500 MG/1
TABLET, EXTENDED RELEASE ORAL
Qty: 90 TABLET | Refills: 0 | Status: SHIPPED | OUTPATIENT
Start: 2025-01-17

## 2025-01-17 NOTE — TELEPHONE ENCOUNTER
Medication:   Requested Prescriptions     Pending Prescriptions Disp Refills    metFORMIN (GLUCOPHAGE-XR) 500 MG extended release tablet [Pharmacy Med Name: METFORMIN ER 500MG 24HR TABS] 90 tablet 0     Sig: TAKE 1 TABLET BY MOUTH DAILY WITH BREAKFAST       Last Filled:      Patient Phone Number: [phone] (home) [phone] (work)    Last appt: 11/18/2024   Next appt: 2/28/2025    Last Labs DM:   Lab Results   Component Value Date/Time    LABA1C 6.4 11/15/2024 07:44 AM

## 2025-02-28 ENCOUNTER — OFFICE VISIT (OUTPATIENT)
Dept: ENDOCRINOLOGY | Age: 60
End: 2025-02-28

## 2025-02-28 VITALS
SYSTOLIC BLOOD PRESSURE: 137 MMHG | HEART RATE: 68 BPM | RESPIRATION RATE: 14 BRPM | DIASTOLIC BLOOD PRESSURE: 84 MMHG | HEIGHT: 65 IN | BODY MASS INDEX: 35.99 KG/M2 | OXYGEN SATURATION: 96 % | WEIGHT: 216 LBS | TEMPERATURE: 98 F

## 2025-02-28 DIAGNOSIS — E66.812 CLASS 2 SEVERE OBESITY WITH SERIOUS COMORBIDITY AND BODY MASS INDEX (BMI) OF 35.0 TO 35.9 IN ADULT, UNSPECIFIED OBESITY TYPE: ICD-10-CM

## 2025-02-28 DIAGNOSIS — E11.40 TYPE 2 DIABETES, CONTROLLED, WITH NEUROPATHY (HCC): ICD-10-CM

## 2025-02-28 DIAGNOSIS — E06.3 HASHIMOTO'S THYROIDITIS: ICD-10-CM

## 2025-02-28 DIAGNOSIS — E27.9 ADRENAL NODULE: ICD-10-CM

## 2025-02-28 DIAGNOSIS — E11.40 POORLY CONTROLLED TYPE 2 DIABETES MELLITUS WITH NEUROPATHY (HCC): ICD-10-CM

## 2025-02-28 DIAGNOSIS — R80.9 MICROALBUMINURIA: ICD-10-CM

## 2025-02-28 DIAGNOSIS — E11.40 TYPE 2 DIABETES, CONTROLLED, WITH NEUROPATHY (HCC): Primary | ICD-10-CM

## 2025-02-28 DIAGNOSIS — E66.01 CLASS 2 SEVERE OBESITY WITH SERIOUS COMORBIDITY AND BODY MASS INDEX (BMI) OF 35.0 TO 35.9 IN ADULT, UNSPECIFIED OBESITY TYPE: ICD-10-CM

## 2025-02-28 DIAGNOSIS — E78.2 MIXED HYPERLIPIDEMIA: ICD-10-CM

## 2025-02-28 DIAGNOSIS — E11.65 POORLY CONTROLLED TYPE 2 DIABETES MELLITUS WITH NEUROPATHY (HCC): ICD-10-CM

## 2025-02-28 PROBLEM — E66.811 CLASS 1 OBESITY WITH SERIOUS COMORBIDITY AND BODY MASS INDEX (BMI) OF 34.0 TO 34.9 IN ADULT: Status: ACTIVE | Noted: 2025-02-28

## 2025-02-28 RX ORDER — GLIPIZIDE 5 MG/1
5 TABLET, FILM COATED, EXTENDED RELEASE ORAL DAILY
Qty: 90 TABLET | Refills: 1
Start: 2025-02-28

## 2025-02-28 RX ORDER — ATORVASTATIN CALCIUM 20 MG/1
20 TABLET, FILM COATED ORAL DAILY
COMMUNITY
Start: 2024-11-26

## 2025-02-28 RX ORDER — HYDROCHLOROTHIAZIDE 12.5 MG/1
CAPSULE ORAL
Qty: 6 EACH | Refills: 0 | Status: SHIPPED | OUTPATIENT
Start: 2025-02-28

## 2025-02-28 RX ORDER — LISINOPRIL 2.5 MG/1
2.5 TABLET ORAL DAILY
COMMUNITY
Start: 2024-12-03

## 2025-03-01 LAB
ALBUMIN SERPL-MCNC: 4.3 G/DL (ref 3.4–5)
ALBUMIN/GLOB SERPL: 1.7 {RATIO} (ref 1.1–2.2)
ALP SERPL-CCNC: 98 U/L (ref 40–129)
ALT SERPL-CCNC: 18 U/L (ref 10–40)
ANION GAP SERPL CALCULATED.3IONS-SCNC: 15 MMOL/L (ref 3–16)
AST SERPL-CCNC: 15 U/L (ref 15–37)
BILIRUB SERPL-MCNC: <0.2 MG/DL (ref 0–1)
BUN SERPL-MCNC: 20 MG/DL (ref 7–20)
CALCIUM SERPL-MCNC: 9.8 MG/DL (ref 8.3–10.6)
CHLORIDE SERPL-SCNC: 105 MMOL/L (ref 99–110)
CHOLEST SERPL-MCNC: 133 MG/DL (ref 0–199)
CO2 SERPL-SCNC: 21 MMOL/L (ref 21–32)
CREAT SERPL-MCNC: 1 MG/DL (ref 0.6–1.1)
CREAT UR-MCNC: 127 MG/DL (ref 28–259)
EST. AVERAGE GLUCOSE BLD GHB EST-MCNC: 139.9 MG/DL
GFR SERPLBLD CREATININE-BSD FMLA CKD-EPI: 66 ML/MIN/{1.73_M2}
GLUCOSE SERPL-MCNC: 113 MG/DL (ref 70–99)
HBA1C MFR BLD: 6.5 %
HDLC SERPL-MCNC: 43 MG/DL (ref 40–60)
LDL CHOLESTEROL: 70 MG/DL
MICROALBUMIN UR DL<=1MG/L-MCNC: <1.2 MG/DL
MICROALBUMIN/CREAT UR: NORMAL MG/G (ref 0–30)
POTASSIUM SERPL-SCNC: 4.2 MMOL/L (ref 3.5–5.1)
PROT SERPL-MCNC: 6.9 G/DL (ref 6.4–8.2)
SODIUM SERPL-SCNC: 141 MMOL/L (ref 136–145)
T4 FREE SERPL-MCNC: 1.1 NG/DL (ref 0.9–1.8)
TRIGL SERPL-MCNC: 98 MG/DL (ref 0–150)
TSH SERPL DL<=0.005 MIU/L-ACNC: 2.54 UIU/ML (ref 0.27–4.2)
VLDLC SERPL CALC-MCNC: 20 MG/DL

## 2025-03-02 ENCOUNTER — TELEPHONE (OUTPATIENT)
Dept: ENDOCRINOLOGY | Age: 60
End: 2025-03-02

## 2025-03-03 RX ORDER — LISINOPRIL 2.5 MG/1
2.5 TABLET ORAL DAILY
Qty: 30 TABLET | Refills: 2 | Status: CANCELLED | OUTPATIENT
Start: 2025-03-03

## 2025-03-03 NOTE — TELEPHONE ENCOUNTER
Called and spoke with pt. Message was given verbatim. Pt is requesting a refill on lisinopril. Please advise. NOV: 5/30/2025

## 2025-03-09 DIAGNOSIS — R80.9 MICROALBUMINURIA: ICD-10-CM

## 2025-03-09 DIAGNOSIS — E78.2 MIXED HYPERLIPIDEMIA: ICD-10-CM

## 2025-03-09 DIAGNOSIS — E06.3 HASHIMOTO'S THYROIDITIS: ICD-10-CM

## 2025-03-09 DIAGNOSIS — E11.40 TYPE 2 DIABETES, CONTROLLED, WITH NEUROPATHY (HCC): ICD-10-CM

## 2025-03-10 RX ORDER — GLIPIZIDE 5 MG/1
10 TABLET, FILM COATED, EXTENDED RELEASE ORAL 2 TIMES DAILY
Qty: 360 TABLET | Refills: 0 | Status: SHIPPED | OUTPATIENT
Start: 2025-03-10

## 2025-04-28 DIAGNOSIS — E11.40 TYPE 2 DIABETES, CONTROLLED, WITH NEUROPATHY (HCC): ICD-10-CM

## 2025-04-28 RX ORDER — METFORMIN HYDROCHLORIDE 500 MG/1
500 TABLET, EXTENDED RELEASE ORAL DAILY
Qty: 90 TABLET | Refills: 0 | Status: SHIPPED | OUTPATIENT
Start: 2025-04-28

## 2025-05-29 DIAGNOSIS — E11.40 TYPE 2 DIABETES, CONTROLLED, WITH NEUROPATHY (HCC): ICD-10-CM

## 2025-05-29 DIAGNOSIS — E27.9 ADRENAL NODULE: ICD-10-CM

## 2025-05-29 DIAGNOSIS — E06.3 HASHIMOTO'S THYROIDITIS: ICD-10-CM

## 2025-05-29 DIAGNOSIS — R80.9 MICROALBUMINURIA: ICD-10-CM

## 2025-05-29 DIAGNOSIS — E78.2 MIXED HYPERLIPIDEMIA: ICD-10-CM

## 2025-05-30 ENCOUNTER — OFFICE VISIT (OUTPATIENT)
Dept: ENDOCRINOLOGY | Age: 60
End: 2025-05-30
Payer: COMMERCIAL

## 2025-05-30 VITALS
BODY MASS INDEX: 36.15 KG/M2 | OXYGEN SATURATION: 96 % | SYSTOLIC BLOOD PRESSURE: 138 MMHG | HEART RATE: 75 BPM | RESPIRATION RATE: 14 BRPM | DIASTOLIC BLOOD PRESSURE: 81 MMHG | TEMPERATURE: 98 F | HEIGHT: 65 IN | WEIGHT: 217 LBS

## 2025-05-30 DIAGNOSIS — E11.65 POORLY CONTROLLED TYPE 2 DIABETES MELLITUS WITH NEUROPATHY (HCC): ICD-10-CM

## 2025-05-30 DIAGNOSIS — E11.40 POORLY CONTROLLED TYPE 2 DIABETES MELLITUS WITH NEUROPATHY (HCC): ICD-10-CM

## 2025-05-30 DIAGNOSIS — E66.01 CLASS 2 SEVERE OBESITY WITH SERIOUS COMORBIDITY AND BODY MASS INDEX (BMI) OF 35.0 TO 35.9 IN ADULT, UNSPECIFIED OBESITY TYPE (HCC): ICD-10-CM

## 2025-05-30 DIAGNOSIS — E06.3 HASHIMOTO'S THYROIDITIS: ICD-10-CM

## 2025-05-30 DIAGNOSIS — E27.9 ADRENAL NODULE: ICD-10-CM

## 2025-05-30 DIAGNOSIS — E66.812 CLASS 2 SEVERE OBESITY WITH SERIOUS COMORBIDITY AND BODY MASS INDEX (BMI) OF 35.0 TO 35.9 IN ADULT, UNSPECIFIED OBESITY TYPE (HCC): ICD-10-CM

## 2025-05-30 DIAGNOSIS — E78.2 MIXED HYPERLIPIDEMIA: ICD-10-CM

## 2025-05-30 DIAGNOSIS — R80.9 MICROALBUMINURIA: ICD-10-CM

## 2025-05-30 DIAGNOSIS — E11.40 TYPE 2 DIABETES, CONTROLLED, WITH NEUROPATHY (HCC): Primary | ICD-10-CM

## 2025-05-30 LAB
ALBUMIN SERPL-MCNC: 4.3 G/DL (ref 3.4–5)
ALBUMIN/GLOB SERPL: 1.7 {RATIO} (ref 1.1–2.2)
ALP SERPL-CCNC: 90 U/L (ref 40–129)
ALT SERPL-CCNC: 18 U/L (ref 10–40)
ANION GAP SERPL CALCULATED.3IONS-SCNC: 9 MMOL/L (ref 3–16)
AST SERPL-CCNC: 15 U/L (ref 15–37)
BILIRUB SERPL-MCNC: 0.3 MG/DL (ref 0–1)
BUN SERPL-MCNC: 18 MG/DL (ref 7–20)
CALCIUM SERPL-MCNC: 9.6 MG/DL (ref 8.3–10.6)
CHLORIDE SERPL-SCNC: 106 MMOL/L (ref 99–110)
CHOLEST SERPL-MCNC: 143 MG/DL (ref 0–199)
CO2 SERPL-SCNC: 25 MMOL/L (ref 21–32)
CREAT SERPL-MCNC: 0.9 MG/DL (ref 0.6–1.1)
EST. AVERAGE GLUCOSE BLD GHB EST-MCNC: 134.1 MG/DL
GFR SERPLBLD CREATININE-BSD FMLA CKD-EPI: 74 ML/MIN/{1.73_M2}
GLUCOSE SERPL-MCNC: 103 MG/DL (ref 70–99)
HBA1C MFR BLD: 6.3 %
HDLC SERPL-MCNC: 37 MG/DL (ref 40–60)
LDL CHOLESTEROL: 74 MG/DL
POTASSIUM SERPL-SCNC: 4.6 MMOL/L (ref 3.5–5.1)
PROT SERPL-MCNC: 6.8 G/DL (ref 6.4–8.2)
SODIUM SERPL-SCNC: 140 MMOL/L (ref 136–145)
T4 FREE SERPL-MCNC: 1 NG/DL (ref 0.9–1.8)
TRIGL SERPL-MCNC: 161 MG/DL (ref 0–150)
TSH SERPL DL<=0.005 MIU/L-ACNC: 2.22 UIU/ML (ref 0.27–4.2)
VLDLC SERPL CALC-MCNC: 32 MG/DL

## 2025-05-30 PROCEDURE — 3044F HG A1C LEVEL LT 7.0%: CPT | Performed by: INTERNAL MEDICINE

## 2025-05-30 PROCEDURE — 3079F DIAST BP 80-89 MM HG: CPT | Performed by: INTERNAL MEDICINE

## 2025-05-30 PROCEDURE — 95251 CONT GLUC MNTR ANALYSIS I&R: CPT | Performed by: INTERNAL MEDICINE

## 2025-05-30 PROCEDURE — 99214 OFFICE O/P EST MOD 30 MIN: CPT | Performed by: INTERNAL MEDICINE

## 2025-05-30 PROCEDURE — 3075F SYST BP GE 130 - 139MM HG: CPT | Performed by: INTERNAL MEDICINE

## 2025-05-30 RX ORDER — HYDROCHLOROTHIAZIDE 12.5 MG/1
CAPSULE ORAL
Qty: 6 EACH | Refills: 3 | Status: SHIPPED | OUTPATIENT
Start: 2025-05-30

## 2025-05-30 RX ORDER — GLIPIZIDE 5 MG/1
10 TABLET, FILM COATED, EXTENDED RELEASE ORAL 2 TIMES DAILY
Qty: 360 TABLET | Refills: 1 | Status: SHIPPED | OUTPATIENT
Start: 2025-05-30 | End: 2025-05-30 | Stop reason: SDUPTHER

## 2025-05-30 RX ORDER — METFORMIN HYDROCHLORIDE 500 MG/1
500 TABLET, EXTENDED RELEASE ORAL DAILY
Qty: 90 TABLET | Refills: 1 | Status: SHIPPED | OUTPATIENT
Start: 2025-05-30

## 2025-05-30 RX ORDER — DEXAMETHASONE 1 MG
TABLET ORAL
Qty: 1 TABLET | Refills: 0 | Status: SHIPPED | OUTPATIENT
Start: 2025-05-30

## 2025-05-30 RX ORDER — GLIPIZIDE 5 MG/1
5 TABLET, FILM COATED, EXTENDED RELEASE ORAL 2 TIMES DAILY
Qty: 360 TABLET | Refills: 1
Start: 2025-05-30

## 2025-05-30 NOTE — PROGRESS NOTES
Barbara Malik is a 59 y.o. female who is being evaluated for Type 2 diabetes mellitus.     Current symptoms/problems include  hyperglycemia  and are unchanged.     Type 2 diabetes, controlled, with neuropathy (HCC) [E11.40]    Diagnosed with Type 2 diabetes mellitus in 2018.     Comorbid conditions:  hyperlipidemia, obesity and Neuropathy     Current diabetic medications include: glipizide ER 5 mg 2 tabs bid, Metformin  mg 1 tab in AM, Ozempic weekly     Intolerance to diabetes medications: Yes Metformin higher dose caused diarrhea  Trulicity 1.5 mg too much upset stomach, could not tolerate  Ozempic, Januvia too expensive.   No history of UTIs.      Weight trend: stable  Prior visit with dietician: yes  Current diet: on average, 3 meals per day  Current exercise: no     Current monitoring regimen: home blood tests - 2 times daily  Has brought blood glucose log/meter:  Yes  Home blood sugar records: fasting range: 100-120 and postprandial range: 100-140  Any episodes of hypoglycemia? No  Hypoglycemia frequency and time(s):    Does patient have Glucagon emergency kit? No  Does patient have rapid acting carbohydrate?  No  Does patient wear a medic alert bracelet or necklace?  No     2. Mixed hyperlipidemia  Has muscle pain     3. Obesity  Trying to eat healthier  No exercise.  No difficulty swallowing, voice change  No history of radiation to her neck  No family history of thyroid cancer     4. Hashimoto's thyroiditis  Has fatigue, dry skin, thinner hair    5. Microalbuminuria  No urination problems other than frequency    6. Adrenal nodule ([facility]) [E27.8]     The patient had a CT scan on 9/10/2024 for left flank pain.  She was found to have adrenal nodule and referred for evaluation.  Patient was not aware that she has adrenal nodule.  She denies abdominal pain, nausea or vomiting.  She does not have hirsutism.     7. Hypertension  Recent diagnosis.  Patient stated that she was started on lisinopril.  Patient

## 2025-06-16 DIAGNOSIS — E11.40 TYPE 2 DIABETES, CONTROLLED, WITH NEUROPATHY (HCC): ICD-10-CM

## 2025-06-16 DIAGNOSIS — E78.2 MIXED HYPERLIPIDEMIA: ICD-10-CM

## 2025-06-16 DIAGNOSIS — R80.9 MICROALBUMINURIA: ICD-10-CM

## 2025-06-16 DIAGNOSIS — E06.3 HASHIMOTO'S THYROIDITIS: ICD-10-CM

## 2025-06-16 DIAGNOSIS — E27.9 ADRENAL NODULE: ICD-10-CM

## 2025-06-16 RX ORDER — GLIPIZIDE 5 MG/1
TABLET, FILM COATED, EXTENDED RELEASE ORAL
Qty: 180 TABLET | Refills: 1 | Status: SHIPPED | OUTPATIENT
Start: 2025-06-16

## 2025-07-03 ENCOUNTER — APPOINTMENT (OUTPATIENT)
Age: 60
End: 2025-07-03
Payer: COMMERCIAL

## 2025-07-03 ENCOUNTER — HOSPITAL ENCOUNTER (EMERGENCY)
Age: 60
Discharge: HOME | End: 2025-07-03
Attending: EMERGENCY MEDICINE
Payer: COMMERCIAL

## 2025-07-03 VITALS
DIASTOLIC BLOOD PRESSURE: 78 MMHG | SYSTOLIC BLOOD PRESSURE: 125 MMHG | BODY MASS INDEX: 37.34 KG/M2 | OXYGEN SATURATION: 95 % | WEIGHT: 224.1 LBS | TEMPERATURE: 97.3 F | RESPIRATION RATE: 16 BRPM | HEART RATE: 77 BPM | HEIGHT: 65 IN

## 2025-07-03 DIAGNOSIS — S63.501A RIGHT WRIST SPRAIN, INITIAL ENCOUNTER: Primary | ICD-10-CM
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
## 2025-07-03 PROCEDURE — 73110 X-RAY EXAM OF WRIST: CPT

## 2025-07-03 PROCEDURE — 6360000002 HC RX W HCPCS: Performed by: EMERGENCY MEDICINE

## 2025-07-03 PROCEDURE — 96372 THER/PROPH/DIAG INJ SC/IM: CPT

## 2025-07-03 PROCEDURE — 99284 EMERGENCY DEPT VISIT MOD MDM: CPT

## 2025-07-03 PROCEDURE — 6370000000 HC RX 637 (ALT 250 FOR IP): Performed by: EMERGENCY MEDICINE

## 2025-07-03 RX ORDER — NAPROXEN 375 MG/1
375 TABLET ORAL 2 TIMES DAILY WITH MEALS
Qty: 20 TABLET | Refills: 0 | Status: SHIPPED | OUTPATIENT
Start: 2025-07-03 | End: 2025-07-13

## 2025-07-03 RX ORDER — ACETAMINOPHEN 500 MG
1000 TABLET ORAL ONCE
Status: COMPLETED | OUTPATIENT
Start: 2025-07-03 | End: 2025-07-03

## 2025-07-03 RX ORDER — KETOROLAC TROMETHAMINE 15 MG/ML
15 INJECTION, SOLUTION INTRAMUSCULAR; INTRAVENOUS ONCE
Status: COMPLETED | OUTPATIENT
Start: 2025-07-03 | End: 2025-07-03

## 2025-07-03 RX ADMIN — KETOROLAC TROMETHAMINE 15 MG: 15 INJECTION, SOLUTION INTRAMUSCULAR; INTRAVENOUS at 11:28

## 2025-07-03 RX ADMIN — ACETAMINOPHEN 1000 MG: 500 TABLET ORAL at 11:28

## 2025-07-03 ASSESSMENT — LIFESTYLE VARIABLES
HOW OFTEN DO YOU HAVE A DRINK CONTAINING ALCOHOL: NEVER
HOW MANY STANDARD DRINKS CONTAINING ALCOHOL DO YOU HAVE ON A TYPICAL DAY: PATIENT DOES NOT DRINK

## 2025-07-03 ASSESSMENT — PAIN SCALES - GENERAL
PAINLEVEL_OUTOF10: 7
PAINLEVEL_OUTOF10: 4

## 2025-07-03 ASSESSMENT — PAIN DESCRIPTION - ORIENTATION: ORIENTATION: RIGHT

## 2025-07-03 ASSESSMENT — PAIN - FUNCTIONAL ASSESSMENT: PAIN_FUNCTIONAL_ASSESSMENT: 0-10

## 2025-07-03 ASSESSMENT — PAIN DESCRIPTION - LOCATION: LOCATION: ARM

## 2025-07-03 NOTE — ED PROVIDER NOTES
No     Exclusion criteria - the patient is NOT to be included for SEP-1 Core Measure due to:  Infection is not suspected      MDM and ED Course    Patient afebrile and nontoxic.  She is in no distress.  Does report some paresthesias along median nerve distribution of right hand, otherwise upper extremities are neurovascularly intact.  Nothing to suggest limb ischemia or compartment syndrome.  No evidence of skin, soft tissue or joint space infection.  Plain films without evidence of acute fracture or dislocation.  No focal anatomic snuffbox tenderness, doubt occult scaphoid fracture.  Suspect paresthesias likely secondary to contusion and edema.  Will plan for initial conservative management with RICE, anti-inflammatories and wrist brace.  Patient's pain well-controlled.  [place] [facility] for discharge with close PCP as well as orthopedic follow-up and this contact information was provided.  She is agreeable with plan and feels comfortable returning to home.  Return precautions were discussed.      During the patient's ED course, the patient was given:  Medications   ketorolac (TORADOL) injection 15 mg (15 mg IntraMUSCular Given 7/3/25 1128)   acetaminophen (TYLENOL) tablet 1,000 mg (1,000 mg Oral Given 7/3/25 1128)        Consults:  None        History obtained from: Patient and Friend    Pertinent social determinants of health: None applicable    Chronic conditions potentially affecting care: None applicable    Review of other records:  None    Reassessment:  See MDM for details of medications given and reassessment      I Dr. Mills am the primary clinician of record.        Final Impression  1. Right wrist sprain, initial encounter    2. Fall from roller skates, initial encounter        Blood pressure 125/78, pulse 77, temperature 97.3 °F (36.3 °C), temperature source Oral, resp. rate 16, height 1.651 m (5' 5\"), weight 101.7 kg (224 lb 1.6 oz), SpO2 95%.     Disposition:  DISPOSITION Decision To Discharge 07/03/2025  11:58:35 AM   DISPOSITION CONDITION Stable           Patient Referrals:  Jace Khoury PA  [address]   [place] [address]  [phone]    In 3 days  Orthopedics - for your wrist pain and swelling    Vanita Becerra MD  [address]  [address]  [place] [address] [phone]    In 1 week        Discharge Medications:  Discharge Medication List as of 7/3/2025 12:01 PM        START taking these medications    Details   naproxen (NAPROSYN) 375 MG tablet Take 1 tablet by mouth 2 times daily (with meals) for 10 days, Disp-20 tablet, R-0Normal             Discontinued Medications:  Discharge Medication List as of 7/3/2025 12:01 PM          This chart was generated using the Dragon dictation system. I created this record but it may contain dictation errors given the limitations of this technology.    Marquez Mills DO (electronically signed)  Attending Emergency Physician         Marquez Mills DO  07/03/25 2295

## 2025-07-03 NOTE — DISCHARGE INSTR - COC
Continuity of Care Form    Patient Name: Barbara Mcintyre   :  1965  MRN:  [id number]    Admit date:  7/3/2025  Discharge date:  ***    Code Status Order: No Order   Advance Directives:     Admitting Physician:  No admitting provider for patient encounter.  PCP: Vanita Becerra MD    Discharging Nurse: ***  Discharging Hospital Unit/Room#: [facility]/NONE  Discharging Unit Phone Number: ***    Emergency Contact:   Extended Emergency Contact Information  Primary Emergency Contact: soumya mcintyre  Address: [address] [place] [address]  Home Phone: [phone]  Mobile Phone: [phone]  Relation: Spouse    Past Surgical History:  Past Surgical History:   Procedure Laterality Date    HYSTERECTOMY (CERVIX STATUS UNKNOWN)      LITHOTRIPSY      x8       Immunization History:   Immunization History   Administered Date(s) Administered    COVID-19, PFIZER PURPLE top, DILUTE for use, (age 12 y+), 30mcg/0.3mL 2021, 2021, 10/24/2021       Active Problems:  Patient Active Problem List   Diagnosis Code    Mixed hyperlipidemia E78.2    Class 2 obesity with body mass index (BMI) of 38.0 to 38.9 in adult E66.812, Z68.38    Hashimoto's thyroiditis E06.3    Type 2 diabetes, controlled, with neuropathy ([facility]) E11.40    Class 2 obesity with body mass index (BMI) of 36.0 to 36.9 in adult E66.812, Z68.36    Poorly controlled type 2 diabetes mellitus with neuropathy (HCC) E11.40, E11.65    Class 2 obesity with body mass index (BMI) of 37.0 to 37.9 in adult E66.812, Z68.37    Class 2 severe obesity with serious comorbidity and body mass index (BMI) of 39.0 to 39.9 in adult ([facility]) E66.812, E66.01, Z68.39    Microalbuminuria R80.9    Adrenal nodule E27.9    Hypertension I10    Class 1 obesity with serious comorbidity and body mass index (BMI) of 34.0 to 34.9 in adult E66.811, Z68.34    Class 2 severe obesity with serious comorbidity and body mass index (BMI) of 35.0 to 35.9 in adult ([facility])  E66.812, E66.01, Z68.35       Isolation/Infection:   Isolation            No Isolation          Patient Infection Status    None to display         Nurse Assessment:  Last Vital Signs: /78   Pulse 77   Temp 97.3 °F (36.3 °C) (Oral)   Resp 16   Ht 1.651 m (5' 5\")   Wt 101.7 kg (224 lb 1.6 oz)   SpO2 95%   BMI 37.29 kg/m²     Last documented pain score (0-10 scale): Pain Level: 4  Last Weight:   Wt Readings from Last 1 Encounters:   07/03/25 101.7 kg (224 lb 1.6 oz)     Mental Status:  {IP PT MENTAL STATUS:20030}    IV Access:  { SHELBIE IV ACCESS:344167714}    Nursing Mobility/ADLs:  Walking   {P DME ADLs:034481304}  Transfer  {CHP DME ADLs:802275975}  Bathing  {CHP DME ADLs:288870710}  Dressing  {CHP DME ADLs:318976329}  Toileting  {CHP DME ADLs:191100969}  Feeding  {CHP DME ADLs:560647789}  Med Admin  {P DME ADLs:691951282}  Med Delivery   { SHELBIE MED Delivery:513213004}    Wound Care Documentation and Therapy:        Elimination:  Continence:   Bowel: {YES / NO:19727}  Bladder: {YES / NO:19727}  Urinary Catheter: {Urinary Catheter:759750109}   Colostomy/Ileostomy/Ileal Conduit: {YES / NO:19727}       Date of Last BM: ***  No intake or output data in the 24 hours ending 07/03/25 1210  No intake/output data recorded.    Safety Concerns:     { SHELBIE Safety Concerns:275573557}    Impairments/Disabilities:      { SHELBIE Impairments/Disabilities:645547394}    Nutrition Therapy:  Current Nutrition Therapy:   { SHELIBE Diet List:191201004}    Routes of Feeding: {P DME Other Feedings:930461135}  Liquids: {Slp liquid thickness:10385}  Daily Fluid Restriction: {CHP DME Yes amt example:731871022}  Last Modified Barium Swallow with Video (Video Swallowing Test): {Done Not Done Date:304088012}    Treatments at the Time of Hospital Discharge:   Respiratory Treatments: ***  Oxygen Therapy:  {Therapy; copd oxygen:47380}  Ventilator:    { CC Vent List:183919261}    Rehab Therapies: {THERAPEUTIC